# Patient Record
Sex: MALE | Race: OTHER | Employment: FULL TIME | URBAN - METROPOLITAN AREA
[De-identification: names, ages, dates, MRNs, and addresses within clinical notes are randomized per-mention and may not be internally consistent; named-entity substitution may affect disease eponyms.]

---

## 2019-08-28 PROBLEM — R79.89 LOW TESTOSTERONE IN MALE: Status: ACTIVE | Noted: 2019-08-28

## 2020-05-06 ENCOUNTER — APPOINTMENT (OUTPATIENT)
Dept: GENERAL RADIOLOGY | Age: 35
DRG: 871 | End: 2020-05-06
Attending: EMERGENCY MEDICINE
Payer: COMMERCIAL

## 2020-05-06 ENCOUNTER — HOSPITAL ENCOUNTER (INPATIENT)
Age: 35
LOS: 4 days | Discharge: HOME | DRG: 871 | End: 2020-05-11
Attending: EMERGENCY MEDICINE | Admitting: INTERNAL MEDICINE
Payer: COMMERCIAL

## 2020-05-06 DIAGNOSIS — R50.9 FEVER, UNSPECIFIED FEVER CAUSE: ICD-10-CM

## 2020-05-06 DIAGNOSIS — U07.1 PNEUMONIA DUE TO COVID-19 VIRUS: Primary | ICD-10-CM

## 2020-05-06 DIAGNOSIS — J12.82 PNEUMONIA DUE TO COVID-19 VIRUS: Primary | ICD-10-CM

## 2020-05-06 PROBLEM — B97.89 VIRAL SEPSIS (HCC): Status: ACTIVE | Noted: 2020-05-06

## 2020-05-06 PROBLEM — A41.89 VIRAL SEPSIS (HCC): Status: ACTIVE | Noted: 2020-05-06

## 2020-05-06 LAB
ALBUMIN SERPL-MCNC: 4.1 G/DL (ref 3.5–5)
ALBUMIN/GLOB SERPL: 0.9 {RATIO} (ref 1.2–3.5)
ALP SERPL-CCNC: 67 U/L (ref 50–136)
ALT SERPL-CCNC: 62 U/L (ref 12–65)
ANION GAP SERPL CALC-SCNC: 5 MMOL/L (ref 7–16)
APPEARANCE UR: CLEAR
AST SERPL-CCNC: 51 U/L (ref 15–37)
ATRIAL RATE: 120 BPM
BACTERIA URNS QL MICRO: 0 /HPF
BASOPHILS # BLD: 0 K/UL (ref 0–0.2)
BASOPHILS NFR BLD: 0 % (ref 0–2)
BILIRUB SERPL-MCNC: 0.3 MG/DL (ref 0.2–1.1)
BILIRUB UR QL: NEGATIVE
BNP SERPL-MCNC: 17 PG/ML (ref 5–125)
BUN SERPL-MCNC: 8 MG/DL (ref 6–23)
CALCIUM SERPL-MCNC: 8.6 MG/DL (ref 8.3–10.4)
CALCULATED P AXIS, ECG09: 64 DEGREES
CALCULATED R AXIS, ECG10: 76 DEGREES
CALCULATED T AXIS, ECG11: 25 DEGREES
CHLORIDE SERPL-SCNC: 103 MMOL/L (ref 98–107)
CO2 SERPL-SCNC: 31 MMOL/L (ref 21–32)
COLOR UR: YELLOW
CREAT SERPL-MCNC: 0.97 MG/DL (ref 0.8–1.5)
DIAGNOSIS, 93000: NORMAL
DIFFERENTIAL METHOD BLD: ABNORMAL
EOSINOPHIL # BLD: 0 K/UL (ref 0–0.8)
EOSINOPHIL NFR BLD: 0 % (ref 0.5–7.8)
ERYTHROCYTE [DISTWIDTH] IN BLOOD BY AUTOMATED COUNT: 13.1 % (ref 11.9–14.6)
GLOBULIN SER CALC-MCNC: 4.4 G/DL (ref 2.3–3.5)
GLUCOSE SERPL-MCNC: 115 MG/DL (ref 65–100)
GLUCOSE UR STRIP.AUTO-MCNC: NEGATIVE MG/DL
HCT VFR BLD AUTO: 48.5 % (ref 41.1–50.3)
HGB BLD-MCNC: 15.9 G/DL (ref 13.6–17.2)
HGB UR QL STRIP: NEGATIVE
IMM GRANULOCYTES # BLD AUTO: 0 K/UL (ref 0–0.5)
IMM GRANULOCYTES NFR BLD AUTO: 0 % (ref 0–5)
KETONES UR QL STRIP.AUTO: NEGATIVE MG/DL
LACTATE SERPL-SCNC: 1.4 MMOL/L (ref 0.4–2)
LEUKOCYTE ESTERASE UR QL STRIP.AUTO: NEGATIVE
LYMPHOCYTES # BLD: 0.6 K/UL (ref 0.5–4.6)
LYMPHOCYTES NFR BLD: 15 % (ref 13–44)
MCH RBC QN AUTO: 28.5 PG (ref 26.1–32.9)
MCHC RBC AUTO-ENTMCNC: 32.8 G/DL (ref 31.4–35)
MCV RBC AUTO: 86.9 FL (ref 79.6–97.8)
MONOCYTES # BLD: 0.3 K/UL (ref 0.1–1.3)
MONOCYTES NFR BLD: 6 % (ref 4–12)
NEUTS SEG # BLD: 3.4 K/UL (ref 1.7–8.2)
NEUTS SEG NFR BLD: 79 % (ref 43–78)
NITRITE UR QL STRIP.AUTO: NEGATIVE
NRBC # BLD: 0 K/UL (ref 0–0.2)
OTHER OBSERVATIONS,UCOM: ABNORMAL
P-R INTERVAL, ECG05: 134 MS
PH UR STRIP: 6.5 [PH] (ref 5–9)
PLATELET # BLD AUTO: 122 K/UL (ref 150–450)
PMV BLD AUTO: 11.7 FL (ref 9.4–12.3)
POTASSIUM SERPL-SCNC: 3.9 MMOL/L (ref 3.5–5.1)
PROCALCITONIN SERPL-MCNC: 0.13 NG/ML
PROT SERPL-MCNC: 8.5 G/DL (ref 6.3–8.2)
PROT UR STRIP-MCNC: 30 MG/DL
Q-T INTERVAL, ECG07: 304 MS
QRS DURATION, ECG06: 84 MS
QTC CALCULATION (BEZET), ECG08: 429 MS
RBC # BLD AUTO: 5.58 M/UL (ref 4.23–5.6)
SODIUM SERPL-SCNC: 139 MMOL/L (ref 136–145)
SP GR UR REFRACTOMETRY: 1.01 (ref 1–1.02)
UROBILINOGEN UR QL STRIP.AUTO: 1 EU/DL (ref 0.2–1)
VENTRICULAR RATE, ECG03: 120 BPM
WBC # BLD AUTO: 4.3 K/UL (ref 4.3–11.1)
WBC URNS QL MICRO: ABNORMAL /HPF

## 2020-05-06 PROCEDURE — 99285 EMERGENCY DEPT VISIT HI MDM: CPT

## 2020-05-06 PROCEDURE — 83615 LACTATE (LD) (LDH) ENZYME: CPT

## 2020-05-06 PROCEDURE — 84484 ASSAY OF TROPONIN QUANT: CPT

## 2020-05-06 PROCEDURE — 87040 BLOOD CULTURE FOR BACTERIA: CPT

## 2020-05-06 PROCEDURE — 93005 ELECTROCARDIOGRAM TRACING: CPT | Performed by: EMERGENCY MEDICINE

## 2020-05-06 PROCEDURE — 82728 ASSAY OF FERRITIN: CPT

## 2020-05-06 PROCEDURE — 80053 COMPREHEN METABOLIC PANEL: CPT

## 2020-05-06 PROCEDURE — 81001 URINALYSIS AUTO W/SCOPE: CPT

## 2020-05-06 PROCEDURE — 85730 THROMBOPLASTIN TIME PARTIAL: CPT

## 2020-05-06 PROCEDURE — 85384 FIBRINOGEN ACTIVITY: CPT

## 2020-05-06 PROCEDURE — 83605 ASSAY OF LACTIC ACID: CPT

## 2020-05-06 PROCEDURE — 85025 COMPLETE CBC W/AUTO DIFF WBC: CPT

## 2020-05-06 PROCEDURE — 84145 PROCALCITONIN (PCT): CPT

## 2020-05-06 PROCEDURE — 96365 THER/PROPH/DIAG IV INF INIT: CPT

## 2020-05-06 PROCEDURE — 71045 X-RAY EXAM CHEST 1 VIEW: CPT

## 2020-05-06 PROCEDURE — 86140 C-REACTIVE PROTEIN: CPT

## 2020-05-06 PROCEDURE — 74011250636 HC RX REV CODE- 250/636: Performed by: EMERGENCY MEDICINE

## 2020-05-06 PROCEDURE — 83880 ASSAY OF NATRIURETIC PEPTIDE: CPT

## 2020-05-06 PROCEDURE — 83735 ASSAY OF MAGNESIUM: CPT

## 2020-05-06 PROCEDURE — 74011000258 HC RX REV CODE- 258: Performed by: EMERGENCY MEDICINE

## 2020-05-06 PROCEDURE — 74011250637 HC RX REV CODE- 250/637: Performed by: EMERGENCY MEDICINE

## 2020-05-06 PROCEDURE — 85379 FIBRIN DEGRADATION QUANT: CPT

## 2020-05-06 PROCEDURE — 99218 HC RM OBSERVATION: CPT

## 2020-05-06 RX ORDER — ENOXAPARIN SODIUM 100 MG/ML
30 INJECTION SUBCUTANEOUS EVERY 24 HOURS
Status: DISCONTINUED | OUTPATIENT
Start: 2020-05-06 | End: 2020-05-06

## 2020-05-06 RX ORDER — ACETAMINOPHEN 500 MG
1000 TABLET ORAL
Status: COMPLETED | OUTPATIENT
Start: 2020-05-06 | End: 2020-05-06

## 2020-05-06 RX ORDER — ACETAMINOPHEN 650 MG/1
650 SUPPOSITORY RECTAL
Status: DISCONTINUED | OUTPATIENT
Start: 2020-05-06 | End: 2020-05-12 | Stop reason: HOSPADM

## 2020-05-06 RX ORDER — ENOXAPARIN SODIUM 100 MG/ML
40 INJECTION SUBCUTANEOUS EVERY 24 HOURS
Status: DISCONTINUED | OUTPATIENT
Start: 2020-05-07 | End: 2020-05-12 | Stop reason: HOSPADM

## 2020-05-06 RX ORDER — SODIUM CHLORIDE 0.9 % (FLUSH) 0.9 %
5-40 SYRINGE (ML) INJECTION AS NEEDED
Status: DISCONTINUED | OUTPATIENT
Start: 2020-05-06 | End: 2020-05-12 | Stop reason: HOSPADM

## 2020-05-06 RX ORDER — SODIUM CHLORIDE 0.9 % (FLUSH) 0.9 %
5-40 SYRINGE (ML) INJECTION EVERY 8 HOURS
Status: DISCONTINUED | OUTPATIENT
Start: 2020-05-06 | End: 2020-05-12 | Stop reason: HOSPADM

## 2020-05-06 RX ORDER — HYDROXYCHLOROQUINE SULFATE 200 MG/1
200 TABLET, FILM COATED ORAL EVERY 12 HOURS
Status: COMPLETED | OUTPATIENT
Start: 2020-05-08 | End: 2020-05-11

## 2020-05-06 RX ORDER — HYDROXYCHLOROQUINE SULFATE 200 MG/1
400 TABLET, FILM COATED ORAL EVERY 12 HOURS
Status: COMPLETED | OUTPATIENT
Start: 2020-05-07 | End: 2020-05-07

## 2020-05-06 RX ORDER — ACETAMINOPHEN 325 MG/1
650 TABLET ORAL
Status: DISCONTINUED | OUTPATIENT
Start: 2020-05-06 | End: 2020-05-12 | Stop reason: HOSPADM

## 2020-05-06 RX ORDER — ACETAMINOPHEN 325 MG/1
650 TABLET ORAL
Status: DISCONTINUED | OUTPATIENT
Start: 2020-05-06 | End: 2020-05-06 | Stop reason: SDUPTHER

## 2020-05-06 RX ADMIN — CEFTRIAXONE 1 G: 1 INJECTION, POWDER, FOR SOLUTION INTRAMUSCULAR; INTRAVENOUS at 20:22

## 2020-05-06 RX ADMIN — ACETAMINOPHEN 1000 MG: 500 TABLET ORAL at 19:53

## 2020-05-06 RX ADMIN — SODIUM CHLORIDE 1000 ML: 900 INJECTION, SOLUTION INTRAVENOUS at 21:08

## 2020-05-06 NOTE — PROGRESS NOTES
called ED unit secretary desk to offer interpreting services to patient. Hospital  is available 24/7 for over-the-phone language services. Please call [phone] (DT) or [phone] (ES) for all requests.         [facility]  Language Services Department  [address]  [address] [phone] (phone)

## 2020-05-06 NOTE — ED TRIAGE NOTES
Patient states that he was seen at urgent care this AM. Was told by urgent care that he has pneumonia. Was tested Saturday for Covid-19 and called Sunday with a positive test. Patient states that he has been more short of breath today. Patient arrived with mask in place during triage.

## 2020-05-06 NOTE — PROGRESS NOTES
present over the phone for assessment with Evangelista Granda MD and EKG with nurse.       [facility] Services Department  [address] [place] [address]  [address] [phone] (phone)

## 2020-05-07 PROBLEM — U07.1 COVID-19 VIRUS DETECTED: Status: ACTIVE | Noted: 2020-05-07

## 2020-05-07 PROBLEM — R09.02 HYPOXIA: Status: ACTIVE | Noted: 2020-05-07

## 2020-05-07 LAB
ABO + RH BLD: NORMAL
ALBUMIN SERPL-MCNC: 3.3 G/DL (ref 3.5–5)
ALBUMIN/GLOB SERPL: 0.8 {RATIO} (ref 1.2–3.5)
ALP SERPL-CCNC: 65 U/L (ref 50–136)
ALT SERPL-CCNC: 54 U/L (ref 12–65)
ANION GAP SERPL CALC-SCNC: 6 MMOL/L (ref 7–16)
APTT PPP: 30.8 SEC (ref 24.3–35.4)
APTT PPP: 33.7 SEC (ref 24.3–35.4)
AST SERPL-CCNC: 46 U/L (ref 15–37)
BASOPHILS # BLD: 0 K/UL (ref 0–0.2)
BASOPHILS NFR BLD: 0 % (ref 0–2)
BILIRUB SERPL-MCNC: 0.3 MG/DL (ref 0.2–1.1)
BLOOD GROUP ANTIBODIES SERPL: NORMAL
BUN SERPL-MCNC: 7 MG/DL (ref 6–23)
CALCIUM SERPL-MCNC: 8.1 MG/DL (ref 8.3–10.4)
CHLORIDE SERPL-SCNC: 106 MMOL/L (ref 98–107)
CO2 SERPL-SCNC: 26 MMOL/L (ref 21–32)
CREAT SERPL-MCNC: 0.84 MG/DL (ref 0.8–1.5)
CRP SERPL-MCNC: 5.8 MG/DL (ref 0–0.9)
D DIMER PPP FEU-MCNC: 0.46 UG/ML(FEU)
D DIMER PPP FEU-MCNC: 0.82 UG/ML(FEU)
DIFFERENTIAL METHOD BLD: ABNORMAL
EOSINOPHIL # BLD: 0 K/UL (ref 0–0.8)
EOSINOPHIL NFR BLD: 0 % (ref 0.5–7.8)
ERYTHROCYTE [DISTWIDTH] IN BLOOD BY AUTOMATED COUNT: 13.1 % (ref 11.9–14.6)
FERRITIN SERPL-MCNC: 904 NG/ML (ref 8–388)
FIBRINOGEN PPP-MCNC: 534 MG/DL (ref 190–501)
FIBRINOGEN PPP-MCNC: 535 MG/DL (ref 190–501)
GLOBULIN SER CALC-MCNC: 3.9 G/DL (ref 2.3–3.5)
GLUCOSE SERPL-MCNC: 130 MG/DL (ref 65–100)
HCT VFR BLD AUTO: 42.5 % (ref 41.1–50.3)
HGB BLD-MCNC: 14.6 G/DL (ref 13.6–17.2)
IMM GRANULOCYTES # BLD AUTO: 0 K/UL (ref 0–0.5)
IMM GRANULOCYTES NFR BLD AUTO: 0 % (ref 0–5)
INR PPP: 0.9
INR PPP: 0.9
LDH SERPL L TO P-CCNC: 327 U/L (ref 100–190)
LYMPHOCYTES # BLD: 0.5 K/UL (ref 0.5–4.6)
LYMPHOCYTES NFR BLD: 12 % (ref 13–44)
MAGNESIUM SERPL-MCNC: 1.9 MG/DL (ref 1.8–2.4)
MAGNESIUM SERPL-MCNC: 1.9 MG/DL (ref 1.8–2.4)
MCH RBC QN AUTO: 28.9 PG (ref 26.1–32.9)
MCHC RBC AUTO-ENTMCNC: 34.4 G/DL (ref 31.4–35)
MCV RBC AUTO: 84.2 FL (ref 79.6–97.8)
MONOCYTES # BLD: 0.2 K/UL (ref 0.1–1.3)
MONOCYTES NFR BLD: 5 % (ref 4–12)
NEUTS SEG # BLD: 3.8 K/UL (ref 1.7–8.2)
NEUTS SEG NFR BLD: 83 % (ref 43–78)
NRBC # BLD: 0 K/UL (ref 0–0.2)
PHOSPHATE SERPL-MCNC: 2.6 MG/DL (ref 2.5–4.5)
PLATELET # BLD AUTO: 138 K/UL (ref 150–450)
PMV BLD AUTO: 11.2 FL (ref 9.4–12.3)
POTASSIUM SERPL-SCNC: 3.4 MMOL/L (ref 3.5–5.1)
PROT SERPL-MCNC: 7.2 G/DL (ref 6.3–8.2)
PROTHROMBIN TIME: 12.3 SEC (ref 12–14.7)
PROTHROMBIN TIME: 12.8 SEC (ref 12–14.7)
RBC # BLD AUTO: 5.05 M/UL (ref 4.23–5.6)
SODIUM SERPL-SCNC: 138 MMOL/L (ref 136–145)
SPECIMEN EXP DATE BLD: NORMAL
TROPONIN I SERPL-MCNC: <0.02 NG/ML (ref 0.02–0.05)
WBC # BLD AUTO: 4.6 K/UL (ref 4.3–11.1)

## 2020-05-07 PROCEDURE — 99218 HC RM OBSERVATION: CPT

## 2020-05-07 PROCEDURE — 80053 COMPREHEN METABOLIC PANEL: CPT

## 2020-05-07 PROCEDURE — 85379 FIBRIN DEGRADATION QUANT: CPT

## 2020-05-07 PROCEDURE — 85610 PROTHROMBIN TIME: CPT

## 2020-05-07 PROCEDURE — 86900 BLOOD TYPING SEROLOGIC ABO: CPT

## 2020-05-07 PROCEDURE — 74011250636 HC RX REV CODE- 250/636: Performed by: FAMILY MEDICINE

## 2020-05-07 PROCEDURE — 65270000029 HC RM PRIVATE

## 2020-05-07 PROCEDURE — 85730 THROMBOPLASTIN TIME PARTIAL: CPT

## 2020-05-07 PROCEDURE — 83735 ASSAY OF MAGNESIUM: CPT

## 2020-05-07 PROCEDURE — 36415 COLL VENOUS BLD VENIPUNCTURE: CPT

## 2020-05-07 PROCEDURE — 74011250637 HC RX REV CODE- 250/637: Performed by: FAMILY MEDICINE

## 2020-05-07 PROCEDURE — 85384 FIBRINOGEN ACTIVITY: CPT

## 2020-05-07 PROCEDURE — 84100 ASSAY OF PHOSPHORUS: CPT

## 2020-05-07 PROCEDURE — 74011250637 HC RX REV CODE- 250/637: Performed by: INTERNAL MEDICINE

## 2020-05-07 PROCEDURE — 85025 COMPLETE CBC W/AUTO DIFF WBC: CPT

## 2020-05-07 RX ORDER — POTASSIUM CHLORIDE 20 MEQ/1
40 TABLET, EXTENDED RELEASE ORAL
Status: COMPLETED | OUTPATIENT
Start: 2020-05-07 | End: 2020-05-07

## 2020-05-07 RX ORDER — PANTOPRAZOLE SODIUM 40 MG/1
40 TABLET, DELAYED RELEASE ORAL
Status: DISCONTINUED | OUTPATIENT
Start: 2020-05-07 | End: 2020-05-12 | Stop reason: HOSPADM

## 2020-05-07 RX ORDER — ASCORBIC ACID 500 MG
1500 TABLET ORAL DAILY
Status: DISCONTINUED | OUTPATIENT
Start: 2020-05-07 | End: 2020-05-07

## 2020-05-07 RX ORDER — ASCORBIC ACID 500 MG
1500 TABLET ORAL EVERY 6 HOURS
Status: DISCONTINUED | OUTPATIENT
Start: 2020-05-08 | End: 2020-05-12 | Stop reason: HOSPADM

## 2020-05-07 RX ORDER — UREA 10 %
220 LOTION (ML) TOPICAL DAILY
Status: DISCONTINUED | OUTPATIENT
Start: 2020-05-07 | End: 2020-05-12 | Stop reason: HOSPADM

## 2020-05-07 RX ADMIN — ACETAMINOPHEN 650 MG: 325 TABLET, FILM COATED ORAL at 11:09

## 2020-05-07 RX ADMIN — OXYCODONE HYDROCHLORIDE AND ACETAMINOPHEN 1500 MG: 500 TABLET ORAL at 04:00

## 2020-05-07 RX ADMIN — Medication 10 ML: at 11:05

## 2020-05-07 RX ADMIN — ENOXAPARIN SODIUM 40 MG: 40 INJECTION SUBCUTANEOUS at 23:43

## 2020-05-07 RX ADMIN — ACETAMINOPHEN 650 MG: 325 TABLET, FILM COATED ORAL at 04:00

## 2020-05-07 RX ADMIN — OXYCODONE HYDROCHLORIDE AND ACETAMINOPHEN 1500 MG: 500 TABLET ORAL at 23:42

## 2020-05-07 RX ADMIN — PANTOPRAZOLE SODIUM 40 MG: 40 TABLET, DELAYED RELEASE ORAL at 11:02

## 2020-05-07 RX ADMIN — Medication 10 ML: at 01:20

## 2020-05-07 RX ADMIN — Medication 10 ML: at 23:43

## 2020-05-07 RX ADMIN — Medication 220 MG: at 08:23

## 2020-05-07 RX ADMIN — POTASSIUM CHLORIDE 40 MEQ: 20 TABLET, EXTENDED RELEASE ORAL at 11:02

## 2020-05-07 RX ADMIN — HYDROXYCHLOROQUINE SULFATE 400 MG: 200 TABLET ORAL at 11:03

## 2020-05-07 RX ADMIN — HYDROXYCHLOROQUINE SULFATE 400 MG: 200 TABLET ORAL at 01:20

## 2020-05-07 RX ADMIN — Medication 10 ML: at 05:21

## 2020-05-07 NOTE — PHYSICIAN ADVISORY
Letter of Status Determination:  
Recommend hospitalization status upgraded from OBSERVATION  to INPATIENT  Status Pt Name:  Hersey Pallas MR#  
ERIBERTO # [id number] / 
[id number] Payor: Ct Manning / Plan: [place] / Product Type: PPO /   
MAGEN#  [id number] Room and Hospital  504/01  @ [facility] Hospitalization date  5/6/2020  7:23 PM  
Current Attending Physician  Mati Perry MD  
Principal diagnosis  2019 novel coronavirusinfected pneumonia (NCIP) [U07.1, J12.89] Clinicals  29 y.o. y.o  male hospitalized with above diagnosis This pt is receiving complex care for acute viral pneumonia along with high risk for complication [place] (MCG) criteria Does  apply Viral Pneumonia STATUS DETERMINATION  Based on documented presenting clinical data, comorbid conditions, high risk of adverse events and deterioration, it is our recommendation that the patient's status should be upgraded from OBSERVATION to INPATIENT status. The final decision of the patient's hospitalization status depends on the attending physician's judgment. Additional comments Payor: Ct Manning / Plan: [employer] / Product Type: PPO /  
 
The information in this document is a recommendation to be used for utilization review and utilization management purposes only. This recommendation is not an order. The recommendation is made based on the information reviewed at the time of the referral, is pursuant to the [facility] Conditions of Participation (42 CFR Part 482), and is neither a judgment nor an assessment with regard to the appropriateness or quality of clinical care. For all Managed Care patients:  The Criteria are intended solely for use as screening guidelines with respect to the medical appropriateness of healthcare services and not for final clinical or payment determinations concerning the type or level of medical care provided, or proposed to be provided, to a patient. They help the reviewers determine whether a patient is appropriate for observation or inpatient admission at the time a decision to admit the patient is being made. All efforts are made to apply the pertinent payor criteria (MCG or [employer]) as well as the clinical judgements based on the information reviewed at the time of the referral.\" Nothing in this document may be used to limit, alter, or affect clinical services provided to the patient named below. Brett Valentin MD MPH FACP Cell: [phone] Physician Advisor [address]  
   
Cell  [phone]   
 
 
[id number] Nadya Garcia

## 2020-05-07 NOTE — PROGRESS NOTES
05/07/20 1106   Vital Signs   Temp 99.7 °F (37.6 °C)   Temp Source Oral   Pulse (Heart Rate) (!) 117   Resp Rate 21   O2 Sat (%) 95 %   Level of Consciousness Alert   /78   MAP (Calculated) 95   BP 1 Method Automatic   BP 1 Location Left arm   BP Patient Position At rest   MEWS Score 4

## 2020-05-07 NOTE — PROGRESS NOTES
Chart screened by  for discharge planning. No needs identified at this time. Please consult  if any new issues arise  Discharge plan is home with needs at this time.     Care Management Interventions  PCP Verified by CM: Yes(Clark Huntley)  Mode of Transport at Discharge: Self  Transition of Care Consult (CM Consult): Discharge Planning  Discharge Durable Medical Equipment: No  Physical Therapy Consult: No  Occupational Therapy Consult: No  Speech Therapy Consult: No  Current Support Network: Own Home, Family Lives Nearby  Confirm Follow Up Transport: Family  The Patient and/or Patient Representative was Provided with a Choice of Provider and Agrees with the Discharge Plan?: Yes  Freedom of Choice List was Provided with Basic Dialogue that Supports the Patient's Individualized Plan of Care/Goals, Treatment Preferences and Shares the Quality Data Associated with the Providers?: Yes  [employer] Information Provided?: No  Discharge Location  Discharge Placement: Home

## 2020-05-07 NOTE — PROGRESS NOTES
Hospital  called patient's room to conduct \"rounding\" over the phone. A hospital  is available for over-the-phone language services. Please call [phone] (DT) or [phone] (ES) for all requests.      Rohith Delarosa  CERTIFIED HEALTHCARE INTERPRETERTM ([address] [place] [address])  Language Services Department   [address]  [facility]  [phone] (phone)

## 2020-05-07 NOTE — PROGRESS NOTES
rounded on patient with nurse. Interpreting services offered when needed. Hospital  is available 24/7 for over-the-phone language services. Please call [phone] (DT) or [phone] (ES) for all requests.         [facility]  Language Services Department  [address]  [facility]  [id number] [phone] (phone)

## 2020-05-07 NOTE — ED PROVIDER NOTES
15-year-old male presents with complaint of worsening fever, shortness of breath over the past several days. Patient states that his symptoms initially started on Thursday. Patient was reportedly tested for COVID-19 at drive thru testing through [facility] on Saturday and was contacted on Sunday with positive test result. Patient states that he was seen at urgent care earlier this morning and was told by urgent care that he likely had pneumonia. Patient was given prescription for Levaquin. Patient states that he is had progressive shortness of breath of the past several days and fever. Patient denies productive cough, headache, neck pain, sore throat, myalgias, abdominal pain, nausea, vomiting, urinary symptoms. States that his recently tested positive as well. The history is provided by the patient. No  was used. Shortness of Breath   This is a new problem. The problem occurs continuously. The current episode started more than 2 days ago. The problem has not changed since onset. Associated symptoms include a fever. Pertinent negatives include no headaches, no coryza, no rhinorrhea, no sore throat, no swollen glands, no neck pain, no cough, no sputum production, no hemoptysis, no wheezing, no chest pain, no syncope, no vomiting, no abdominal pain, no rash, no leg pain, no leg swelling and no claudication. The treatment provided no relief. Fever    Associated symptoms include shortness of breath. Pertinent negatives include no chest pain, no diarrhea, no vomiting, no congestion, no headaches, no sore throat, no cough, no neck pain and no rash. Past Medical History:   Diagnosis Date    Chronic low back pain without sciatica     GERD (gastroesophageal reflux disease)     Hypercholesterolemia        No past surgical history on file.       Family History:   Problem Relation Age of Onset    No Known Problems Mother     No Known Problems Father        Social History Socioeconomic History    Marital status:      Spouse name: Not on file    Number of children: Not on file    Years of education: Not on file    Highest education level: Not on file   Occupational History    Not on file   Social Needs    Financial resource strain: Not on file    Food insecurity     Worry: Not on file     Inability: Not on file    Transportation needs     Medical: Not on file     Non-medical: Not on file   Tobacco Use    Smoking status: Never Smoker    Smokeless tobacco: Never Used   Substance and Sexual Activity    Alcohol use: No    Drug use: No    Sexual activity: Yes     Partners: Female   Lifestyle    Physical activity     Days per week: Not on file     Minutes per session: Not on file    Stress: Not on file   Relationships    Social connections     Talks on phone: Not on file     Gets together: Not on file     Attends Holiness service: Not on file     Active member of club or organization: Not on file     Attends meetings of clubs or organizations: Not on file     Relationship status: Not on file    Intimate partner violence     Fear of current or ex partner: Not on file     Emotionally abused: Not on file     Physically abused: Not on file     Forced sexual activity: Not on file   Other Topics Concern    Not on file   Social History Narrative    Not on file         ALLERGIES: Patient has no known allergies. Review of Systems   Constitutional: Positive for chills and fever. Negative for diaphoresis and fatigue. HENT: Negative for congestion, rhinorrhea, sore throat, trouble swallowing and voice change. Respiratory: Positive for shortness of breath. Negative for cough, hemoptysis, sputum production, choking, wheezing and stridor. Cardiovascular: Negative for chest pain, claudication, leg swelling and syncope. Gastrointestinal: Negative for abdominal pain, diarrhea, nausea and vomiting. Genitourinary: Negative for dysuria and flank pain. Musculoskeletal: Negative for myalgias, neck pain and neck stiffness. Skin: Negative for rash. Neurological: Negative for dizziness, syncope, weakness, light-headedness, numbness and headaches. Psychiatric/Behavioral: Negative for confusion. Vitals:    05/06/20 1930   BP: 134/83   Pulse: (!) 123   Resp: 23   Temp: (!) 101.2 °F (38.4 °C)   SpO2: 96%   Weight: 108.9 kg (240 lb)   Height: 5' 11\" (1.803 m)            Physical Exam  Vitals signs and nursing note reviewed. Constitutional:       Comments: Ill-appearing. HENT:      Head: Normocephalic. Mouth/Throat:      Mouth: Mucous membranes are moist.   Eyes:      Extraocular Movements: Extraocular movements intact. Pupils: Pupils are equal, round, and reactive to light. Neck:      Vascular: No JVD. Comments: No nuchal rigidity. Cardiovascular:      Rate and Rhythm: Regular rhythm. Tachycardia present. Pulses: Normal pulses. Heart sounds: Normal heart sounds. Comments: Pulses 2+ and equal throughout. Pulmonary:      Comments: Tachypneic. Diminished breath sounds present bilaterally. Musculoskeletal: Normal range of motion. Comments: No LE edema. No calf TTP. Skin:     General: Skin is warm. Findings: No erythema. Neurological:      General: No focal deficit present. Mental Status: He is alert and oriented to person, place, and time. Comments: Strength 5 out of 5 throughout. No meningeal signs present. MDM  Number of Diagnoses or Management Options  Fever, unspecified fever cause: new and requires workup  Pneumonia due to COVID-19 virus: new and requires workup  Diagnosis management comments: 80-year-old male presents with complaint of shortness of breath and fever over the past several days. Patient recently tested positive for COVID-19. Patient was tested on 5/2 and contacted about positive result on 5/3. Patient currently on Levaquin.   Patient found to be febrile, tachycardic, tachypneic. Chest x-ray with evidence of patchy bibasilar airspace opacities. Orders placed for Tylenol, Rocephin 1 g IV, IV fluid hydration. Labs unremarkable. Patient was ambulated in place in room with significant O2 desat to around 88% on room air. Patient became extremely tachypneic and tachycardic with heart rate in the 140s to 150s. Given these findings hospitalist consulted for admission. Amount and/or Complexity of Data Reviewed  Clinical lab tests: ordered and reviewed  Tests in the radiology section of CPT®: ordered and reviewed  Tests in the medicine section of CPT®: reviewed and ordered  Review and summarize past medical records: yes  Independent visualization of images, tracings, or specimens: yes    Risk of Complications, Morbidity, and/or Mortality  Presenting problems: moderate  Diagnostic procedures: moderate  Management options: moderate    Patient Progress  Patient progress: stable    ED Course as of May 06 2118   Wed May 06, 2020   2013 CXR IMPRESSION: Patchy bibasilar airspace opacities could represent pneumonia  including viral pneumonitis given the provided history. [DF]   2115 Patient ambulated in place in room for around 1 minute and was noted to desat to 88% on room air. Patient's heart rate increased to 140s-150s. [DF]      ED Course User Index  [DF] Vivian Vasquez MD       EKG  Date/Time: 5/6/2020 9:05 PM  Performed by: Vivian Vasquez MD  Authorized by:  Vivian Vasquez MD     ECG reviewed by ED Physician in the absence of a cardiologist: yes    Rate:     ECG rate:  120    ECG rate assessment: tachycardic    Rhythm:     Rhythm: sinus tachycardia    Ectopy:     Ectopy: none    QRS:     QRS axis:  Normal    QRS intervals:  Normal  Conduction:     Conduction: normal    ST segments:     ST segments:  Normal  T waves:     T waves: normal            Results Include:    Recent Results (from the past 24 hour(s))   CBC WITH AUTOMATED DIFF Collection Time: 05/06/20  7:49 PM   Result Value Ref Range    WBC 4.3 4.3 - 11.1 K/uL    RBC 5.58 4.23 - 5.6 M/uL    HGB 15.9 13.6 - 17.2 g/dL    HCT 48.5 41.1 - 50.3 %    MCV 86.9 79.6 - 97.8 FL    MCH 28.5 26.1 - 32.9 PG    MCHC 32.8 31.4 - 35.0 g/dL    RDW 13.1 11.9 - 14.6 %    PLATELET 398 (L) 735 - 450 K/uL    MPV 11.7 9.4 - 12.3 FL    ABSOLUTE NRBC 0.00 0.0 - 0.2 K/uL    DF AUTOMATED      NEUTROPHILS 79 (H) 43 - 78 %    LYMPHOCYTES 15 13 - 44 %    MONOCYTES 6 4.0 - 12.0 %    EOSINOPHILS 0 (L) 0.5 - 7.8 %    BASOPHILS 0 0.0 - 2.0 %    IMMATURE GRANULOCYTES 0 0.0 - 5.0 %    ABS. NEUTROPHILS 3.4 1.7 - 8.2 K/UL    ABS. LYMPHOCYTES 0.6 0.5 - 4.6 K/UL    ABS. MONOCYTES 0.3 0.1 - 1.3 K/UL    ABS. EOSINOPHILS 0.0 0.0 - 0.8 K/UL    ABS. BASOPHILS 0.0 0.0 - 0.2 K/UL    ABS. IMM. GRANS. 0.0 0.0 - 0.5 K/UL   METABOLIC PANEL, COMPREHENSIVE    Collection Time: 05/06/20  7:49 PM   Result Value Ref Range    Sodium 139 136 - 145 mmol/L    Potassium 3.9 3.5 - 5.1 mmol/L    Chloride 103 98 - 107 mmol/L    CO2 31 21 - 32 mmol/L    Anion gap 5 (L) 7 - 16 mmol/L    Glucose 115 (H) 65 - 100 mg/dL    BUN 8 6 - 23 MG/DL    Creatinine 0.97 0.8 - 1.5 MG/DL    GFR est AA >60 >60 ml/min/1.73m2    GFR est non-AA >60 >60 ml/min/1.73m2    Calcium 8.6 8.3 - 10.4 MG/DL    Bilirubin, total 0.3 0.2 - 1.1 MG/DL    ALT (SGPT) 62 12 - 65 U/L    AST (SGOT) 51 (H) 15 - 37 U/L    Alk.  phosphatase 67 50 - 136 U/L    Protein, total 8.5 (H) 6.3 - 8.2 g/dL    Albumin 4.1 3.5 - 5.0 g/dL    Globulin 4.4 (H) 2.3 - 3.5 g/dL    A-G Ratio 0.9 (L) 1.2 - 3.5     LACTIC ACID    Collection Time: 05/06/20  7:49 PM   Result Value Ref Range    Lactic acid 1.4 0.4 - 2.0 MMOL/L   EKG, 12 LEAD, INITIAL    Collection Time: 05/06/20  7:50 PM   Result Value Ref Range    Ventricular Rate 120 BPM    Atrial Rate 120 BPM    P-R Interval 134 ms    QRS Duration 84 ms    Q-T Interval 304 ms    QTC Calculation (Bezet) 429 ms    Calculated P Axis 64 degrees    Calculated R Axis 76 degrees    Calculated T Axis 25 degrees    Diagnosis       !! AGE AND GENDER SPECIFIC ECG ANALYSIS !! Sinus tachycardia  Otherwise normal ECG     URINALYSIS W/ RFLX MICROSCOPIC    Collection Time: 05/06/20  8:03 PM   Result Value Ref Range    Color YELLOW      Appearance CLEAR      Specific gravity 1.014 1.001 - 1.023      pH (UA) 6.5 5.0 - 9.0      Protein 30 (A) NEG mg/dL    Glucose Negative mg/dL    Ketone Negative NEG mg/dL    Bilirubin Negative NEG      Blood Negative NEG      Urobilinogen 1.0 0.2 - 1.0 EU/dL    Nitrites Negative NEG      Leukocyte Esterase Negative NEG      WBC 0-3 0 /hpf    Bacteria 0 0 /hpf    Other observations RESULTS VERIFIED MANUALLY       Raymond Del Rosario MD; 5/6/2020 @9:06 PM Voice dictation software was used during the making of this note. This software is not perfect and grammatical and other typographical errors may be present.   This note has not been proofread for errors.  ===================================================================

## 2020-05-07 NOTE — PROGRESS NOTES
Pt in room alert and oriented rr are even lung sounds are course with some crackles noted. O2 3L NC in place. abd is soft bowel sounds are active. Skin intact no new issues noted. Safety measures in place will continue to monitor.

## 2020-05-07 NOTE — PROGRESS NOTES
Pt arrived to floor via stretcher. Pt able to ambulate by self. Pt short of breath when ambulating and at rest. Pt is on 2 L NC. Pt alert and oriented x4. Pt is running sinus tach on remote telemetry. Skin is clean, dry and intact. Dual skin assessment performed with Lemuel Shukla RN. Call light within reach, bed low and locked, door closed.

## 2020-05-07 NOTE — ROUTINE PROCESS
This hospital  called ED nursing station offered language services available 24/7 and provided contact information. Jose M Ramirez [facility] [address]  [facility] Language Services Department 
[address]  [address] 
[phone](FLKWY)

## 2020-05-07 NOTE — PROGRESS NOTES
's initial visit via phone call with assistance from [employer] [place] [address] . Mr. Dante Booker appears to be coping very well during this hospital stay. He voiced no spiritual or emotional concerns. Patient states he is breathing much better than yesterday and that he is hopeful for discharge tomorrow.      [address]  Board Certified

## 2020-05-07 NOTE — PROGRESS NOTES
Hospital  is available for over-the-phone language services. Please call [phone] (DT) or [phone] (ES) for all requests 24/7. Virtual rounding, Interpreting services offered to assist with any requests.         Thank you,      Judie Finney,   Rowdy Vinson [address]  [phone] (phone)

## 2020-05-07 NOTE — H&P
History of Present Illness:  29year old male [facility] on [facility]6/2020 with the complaint of progressive shortness of breath, cough productive of green sputum, pleuritic chest pain. Please note the patient is primarily Kiswahili-speaking and all history was obtained through . Patient tested positive for novel coronavirus infection on 5/2/2020. Patient had progressive symptoms as mentioned above and presented to the emergency department for further evaluation. Patient does condone headache, rhinorrhea, anosmia. Patient denies nausea, vomiting, diarrhea, abdominal pain. Patient has been taking codeine/homatroprine, albuterol MDI, levofloxacin, azithromycin as an outpatient with no improvement    In the emergency department the patient was noted to desaturate to 88% on room air while standing, temperature of 101.2F, heart rate ranging between 112 and 123. Chest x-ray with patchy bibasilar opacities, left greater than right with small left pleural effusion. EKG was sinus tachycardia and . Patient was treated with ceftriaxone and 1 L NS IVF in emergency department    Comprehensive review of systems negative unless stated above. Past Medical History:   HLD  GERD  Impotence    Past Surgical History:  None    Family History:   Mother and father are healthy    Social History:  Works for [employer],   Never smoker, denies alcohol abuse or illicit drug use    Physical Exam:  General: Young  male, sitting up in bed, no acute distress  HEENT: NCAT, moist mucous membranes, surgical mask in place  Skin: No rash noted  Cardio: Tachycardic, regular rhythm, normal S1/S2, no rubs, no gallops, no murmurs  Pulm: Mildly labored respirations on 2 LPM via NC, diminished breath sounds in bilateral bases, no wheezing, rales to mid lung field, no rhonchi  GI: Soft, Nt, Nd, Nml bowel sounds, no masses noted  Extremity: Atraumatic, no deformities, no edema  Neuro: Alert, oriented, moving all extremities, no focal deficits noted  Psych: Pleasant, cooperative, normal range of affect    I have personally reviewed all current data for this patient. Assessment and Plan:    #Novel coronavirus infected pneumonia, acute respiratory failure with hypoxia: Patient tested positive for novel coronavirus on 5/2/2020. Patient progressive symptoms to the point where he was hypoxic on room air with any exertion in the emergency department. Chest x-ray patchy bibasilar opacities and small left pleural effusion. Fibrinogen, d-dimer, ferritin, CRP, LDH all elevated.   Normal INR.  -Admit inpatient  -Supplemental oxygen, wean as able, recommend walking oximetry test on the morning of 5/7/2020 to assess if patient is safe for discharge  -Hydroxychloroquine oral  -Ascorbic acid, zinc sulfate  -Type and screen, may benefit from convalescent plasma  -Daily labs  -Daily EKG    Full Code    Inpatient for above    Enoxaparin for VTE prevention    Please call [phone] for questions or concerns

## 2020-05-07 NOTE — ROUTINE PROCESS
TRANSFER - OUT REPORT: 
 
Verbal report given to refugio(name) on Keren Morataya  being transferred to pulmonary(unit) for routine progression of care Report consisted of patients Situation, Background, Assessment and  
Recommendations(SBAR). Information from the following report(s) SBAR was reviewed with the receiving nurse. Lines:  
Peripheral IV 05/06/20 Right Hand (Active) Phlebitis Assessment 0 5/6/2020  7:58 PM  
Infiltration Assessment 0 5/6/2020  7:58 PM  
Dressing Status Clean, dry, & intact 5/6/2020  7:58 PM  
Hub Color/Line Status Blue 5/6/2020  7:58 PM  
  
 
Opportunity for questions and clarification was provided. Patient transported with: 
 O2 @ 2 liters

## 2020-05-07 NOTE — PROGRESS NOTES
Progress Note    Patient: Lindie Seip MRN: [id number]  SSN: [id number]    YOB: 1985  Age: 29 y.o. Sex: male      Admit Date: 5/6/2020    LOS: 0 days     Subjective:   He was found in no distress, ambulating inside the room. On 2 liters NC, able to speak in full sentence. He has mild cough, no sob. Objective:     Vitals:    05/07/20 0155 05/07/20 0343 05/07/20 0553 05/07/20 0722   BP:  138/68  129/77   Pulse: (!) 103 (!) 130 (!) 119 (!) 115   Resp:  28  24   Temp:  (!) 102.8 °F (39.3 °C) (!) 100.5 °F (38.1 °C) 98.8 °F (37.1 °C)   SpO2:  96%  96%   Weight:       Height:            Intake and Output:  Current Shift: No intake/output data recorded. Last three shifts: No intake/output data recorded. Physical Exam:   GENERAL: alert, cooperative, no distress, appears stated age  EYE: negative  LYMPHATIC: Cervical, supraclavicular, and axillary nodes normal.   THROAT & NECK: normal and no erythema or exudates noted. LUNG: mild rhonchi, no wheezing- on 2 liters NC, O2 95%  HEART: regular rate and rhythm, S1, S2 normal, no murmur, click, rub or gallop  ABDOMEN: soft, non-tender. Bowel sounds normal. No masses,  no organomegaly  EXTREMITIES:  extremities normal, atraumatic, no cyanosis or edema  SKIN: Normal.  NEUROLOGIC: negative  PSYCHIATRIC: non focal    Lab/Data Review: All lab results for the last 24 hours reviewed.        Assessment:     Patient Active Problem List   Diagnosis Code    Hypercholesterolemia E78.00    GERD (gastroesophageal reflux disease) K21.9    Chronic low back pain without sciatica M54.5, G89.29    Low testosterone in male R79.89    2019 novel coronavirusinfected pneumonia (NCIP) U07.1, J12.89    [facility] A41.89, B97.89    BMI 33.0-33.9,adult Z68.33    Hypoxia R09.02    COVID-19 virus detected U07.1       Plan:     #Novel coronavirus infected pneumonia, acute respiratory failure with hypoxia:   Patient tested positive for novel coronavirus on 5/2/2020.  -wean o2 pr  -Hydroxychloroquine oral  -Ascorbic acid, zinc sulfate  -Type and screen, may benefit from convalescent plasma, may contact Dr. Anna Wisdom in am   -Daily labs    -Hypokalemia: replace kcl po      Full Code     Enoxaparin for VTE prevention    Disposition: home once medically stable     Signed By: Curtis Flores MD     May 7, 2020

## 2020-05-08 LAB
ALBUMIN SERPL-MCNC: 3.2 G/DL (ref 3.5–5)
ALBUMIN/GLOB SERPL: 0.8 {RATIO} (ref 1.2–3.5)
ALP SERPL-CCNC: 65 U/L (ref 50–136)
ALT SERPL-CCNC: 61 U/L (ref 12–65)
ANION GAP SERPL CALC-SCNC: 7 MMOL/L (ref 7–16)
APTT PPP: 34 SEC (ref 24.3–35.4)
AST SERPL-CCNC: 51 U/L (ref 15–37)
ATRIAL RATE: 116 BPM
BASOPHILS # BLD: 0 K/UL (ref 0–0.2)
BASOPHILS NFR BLD: 0 % (ref 0–2)
BILIRUB SERPL-MCNC: 0.4 MG/DL (ref 0.2–1.1)
BUN SERPL-MCNC: 8 MG/DL (ref 6–23)
CALCIUM SERPL-MCNC: 8.4 MG/DL (ref 8.3–10.4)
CALCULATED P AXIS, ECG09: 39 DEGREES
CALCULATED R AXIS, ECG10: 35 DEGREES
CALCULATED T AXIS, ECG11: 18 DEGREES
CHLORIDE SERPL-SCNC: 102 MMOL/L (ref 98–107)
CO2 SERPL-SCNC: 28 MMOL/L (ref 21–32)
CREAT SERPL-MCNC: 0.8 MG/DL (ref 0.8–1.5)
D DIMER PPP FEU-MCNC: 0.49 UG/ML(FEU)
DIAGNOSIS, 93000: NORMAL
DIFFERENTIAL METHOD BLD: ABNORMAL
EOSINOPHIL # BLD: 0 K/UL (ref 0–0.8)
EOSINOPHIL NFR BLD: 0 % (ref 0.5–7.8)
ERYTHROCYTE [DISTWIDTH] IN BLOOD BY AUTOMATED COUNT: 13 % (ref 11.9–14.6)
FIBRINOGEN PPP-MCNC: 584 MG/DL (ref 190–501)
GLOBULIN SER CALC-MCNC: 4.1 G/DL (ref 2.3–3.5)
GLUCOSE SERPL-MCNC: 117 MG/DL (ref 65–100)
HCT VFR BLD AUTO: 42.7 % (ref 41.1–50.3)
HGB BLD-MCNC: 14 G/DL (ref 13.6–17.2)
IMM GRANULOCYTES # BLD AUTO: 0 K/UL (ref 0–0.5)
IMM GRANULOCYTES NFR BLD AUTO: 0 % (ref 0–5)
INR PPP: 0.9
LYMPHOCYTES # BLD: 0.8 K/UL (ref 0.5–4.6)
LYMPHOCYTES NFR BLD: 17 % (ref 13–44)
Lab: NORMAL
MAGNESIUM SERPL-MCNC: 2 MG/DL (ref 1.8–2.4)
MCH RBC QN AUTO: 28.1 PG (ref 26.1–32.9)
MCHC RBC AUTO-ENTMCNC: 32.8 G/DL (ref 31.4–35)
MCV RBC AUTO: 85.6 FL (ref 79.6–97.8)
MONOCYTES # BLD: 0.4 K/UL (ref 0.1–1.3)
MONOCYTES NFR BLD: 9 % (ref 4–12)
NEUTS SEG # BLD: 3.4 K/UL (ref 1.7–8.2)
NEUTS SEG NFR BLD: 74 % (ref 43–78)
NRBC # BLD: 0 K/UL (ref 0–0.2)
P-R INTERVAL, ECG05: 126 MS
PHOSPHATE SERPL-MCNC: 3.9 MG/DL (ref 2.5–4.5)
PLATELET # BLD AUTO: 155 K/UL (ref 150–450)
PMV BLD AUTO: 10.8 FL (ref 9.4–12.3)
POTASSIUM SERPL-SCNC: 3.6 MMOL/L (ref 3.5–5.1)
PROT SERPL-MCNC: 7.3 G/DL (ref 6.3–8.2)
PROTHROMBIN TIME: 12.5 SEC (ref 12–14.7)
Q-T INTERVAL, ECG07: 328 MS
QRS DURATION, ECG06: 86 MS
QTC CALCULATION (BEZET), ECG08: 455 MS
RBC # BLD AUTO: 4.99 M/UL (ref 4.23–5.6)
REFERENCE LAB,REFLB: NORMAL
SODIUM SERPL-SCNC: 137 MMOL/L (ref 136–145)
TEST DESCRIPTION:,ATST: NORMAL
VENTRICULAR RATE, ECG03: 116 BPM
WBC # BLD AUTO: 4.6 K/UL (ref 4.3–11.1)

## 2020-05-08 PROCEDURE — 93005 ELECTROCARDIOGRAM TRACING: CPT | Performed by: FAMILY MEDICINE

## 2020-05-08 PROCEDURE — 83735 ASSAY OF MAGNESIUM: CPT

## 2020-05-08 PROCEDURE — 87635 SARS-COV-2 COVID-19 AMP PRB: CPT

## 2020-05-08 PROCEDURE — 74011250637 HC RX REV CODE- 250/637: Performed by: INTERNAL MEDICINE

## 2020-05-08 PROCEDURE — 85025 COMPLETE CBC W/AUTO DIFF WBC: CPT

## 2020-05-08 PROCEDURE — 80053 COMPREHEN METABOLIC PANEL: CPT

## 2020-05-08 PROCEDURE — 74011250637 HC RX REV CODE- 250/637: Performed by: FAMILY MEDICINE

## 2020-05-08 PROCEDURE — 84100 ASSAY OF PHOSPHORUS: CPT

## 2020-05-08 PROCEDURE — 85384 FIBRINOGEN ACTIVITY: CPT

## 2020-05-08 PROCEDURE — 65270000029 HC RM PRIVATE

## 2020-05-08 PROCEDURE — 85610 PROTHROMBIN TIME: CPT

## 2020-05-08 PROCEDURE — 73060999999 HC MISC LAB CHARGE

## 2020-05-08 PROCEDURE — 85730 THROMBOPLASTIN TIME PARTIAL: CPT

## 2020-05-08 PROCEDURE — 85379 FIBRIN DEGRADATION QUANT: CPT

## 2020-05-08 RX ADMIN — Medication 220 MG: at 08:36

## 2020-05-08 RX ADMIN — ACETAMINOPHEN 650 MG: 325 TABLET, FILM COATED ORAL at 01:11

## 2020-05-08 RX ADMIN — HYDROXYCHLOROQUINE SULFATE 200 MG: 200 TABLET ORAL at 01:11

## 2020-05-08 RX ADMIN — Medication 10 ML: at 13:28

## 2020-05-08 RX ADMIN — Medication 10 ML: at 22:29

## 2020-05-08 RX ADMIN — OXYCODONE HYDROCHLORIDE AND ACETAMINOPHEN 1500 MG: 500 TABLET ORAL at 16:08

## 2020-05-08 RX ADMIN — ACETAMINOPHEN 650 MG: 325 TABLET, FILM COATED ORAL at 16:07

## 2020-05-08 RX ADMIN — Medication 10 ML: at 06:23

## 2020-05-08 RX ADMIN — PANTOPRAZOLE SODIUM 40 MG: 40 TABLET, DELAYED RELEASE ORAL at 06:22

## 2020-05-08 RX ADMIN — ACETAMINOPHEN 650 MG: 325 TABLET, FILM COATED ORAL at 08:35

## 2020-05-08 RX ADMIN — OXYCODONE HYDROCHLORIDE AND ACETAMINOPHEN 1500 MG: 500 TABLET ORAL at 12:14

## 2020-05-08 RX ADMIN — HYDROXYCHLOROQUINE SULFATE 200 MG: 200 TABLET ORAL at 13:28

## 2020-05-08 RX ADMIN — OXYCODONE HYDROCHLORIDE AND ACETAMINOPHEN 1500 MG: 500 TABLET ORAL at 06:22

## 2020-05-08 NOTE — PROGRESS NOTES
Pt given tylenol for HA and mild pain. O2 removed pt tolerating well O2 sat is 91%  no c/o increased SOB will continue to monitor.

## 2020-05-08 NOTE — PROGRESS NOTES
Pt received 650mg, 2 tabs of Tylenol for complaint of head pain and some pain in his throat 6/10. Will continue to monitor.

## 2020-05-08 NOTE — PROGRESS NOTES
Pt in room alert and oriented rr are even and unlabored O2 3L NC in place lung sounds are diminished with some wheezing noted. abd is soft bowel soft are active.skin intact no new issues noted. Safety measures in place will continue to monitor.

## 2020-05-08 NOTE — PROGRESS NOTES
Pt is resting in bed at this time. Pt has no s/sx of distress at this time. Pt is on 2L NC. Pt has even and unlabored respirations. Report has been given to oncoming nurse.

## 2020-05-08 NOTE — PROGRESS NOTES
Pt is improving recently was weaned off O2 and is maintaining  Sats above 90%. Pt self-ambulates in the room. Please consult  if any new issues arise. Case Management will continue to follow. DISPLAY PLAN FREE TEXT

## 2020-05-08 NOTE — PROGRESS NOTES
Pt states that he is feeling better with his pain but wishes that he could sleep. Pt has not slept at all tonight.  Dr. Juana Matias was informed that pt was requesting sleep medication at beginning of shift and Dr. Juana Matias declined the request.

## 2020-05-08 NOTE — PROGRESS NOTES
Hospital  is available for over-the-phone language services. Please call [phone] (DT) or [phone] (ES) for all requests 24/7. Virtual rounding, Interpreting services offered to assist with any requests.           Thank you,      Martha Linn,   Rowdy Vinson [address]  [phone] (phone)

## 2020-05-08 NOTE — PROGRESS NOTES
Received bedside shift report from Katrina Saul RN. Pt lying in bed. No apparent distress. Respirations even and unlabored. Instructed to call for assistance with needs, as they arise. Pt voiced understanding.

## 2020-05-08 NOTE — PROGRESS NOTES
Request by treating physician/team  for COVID-19 convalescent plasma based on patient meeting one of the following criteria, which I verified by record review:   1. Age at least 25 years   2. Laboratory confirmed diagnosis of infection with SARS-CoV-2   3. Admitted to an acute care facility for the treatment of [id number] complications   4. Severe or life threatening COVID-19, or judged by the treating provider to be at high risk of progression to severe or life-threatening disease   5. Informed consent provided by the patient or healthcare proxy   Severe COVID-19 is defined by one or more of the following:  dyspnea  respiratory frequency ? 30/min  blood oxygen saturation ? 93%  partial pressure of arterial oxygen to fraction of inspired oxygen ratio < 300  lung infiltrates > 50% within 24 to 48 hours Life-threatening COVID-19 is defined as one or more of the following:    respiratory failure  septic shock  multiple organ dysfunction or failure     Consent was obtained by treating physician/team and patient was registered and assigned patient code. Communicated with blood bank and 1 unit of blood type specific COVID convalescent plasma was ordered for the patient. As per study protocol, adverse events will be monitored and reported and all regulatory procedures will be followed.     Tutu Sierra MD  Director, [facility] and Blood Disorders Program  [id number] [place] [address] Phone

## 2020-05-08 NOTE — PROGRESS NOTES
rounded on patient with nurse. Interpreting services offered when needed. Hospital  is available 24/7 for over-the-phone language services. Please call [phone] (DT) or [phone] (ES) for all requests.         [facility]  Language Services Department  [facility]  [address]  [phone] (phone)

## 2020-05-08 NOTE — PROGRESS NOTES
Pt is alert and oriented times 4. Pt understands some English without the need of . Pt is on 2L NC. Pt states that his throat is a little dry which prevented his from sleeping well last night. Pt has requested something to help him sleep tonight. Pt ha no other complaints of pain. Pt is COVID positive so he is on droplet plus isolation. Pt has call light within reach and expresses that he understands to call for assistance if needed.

## 2020-05-08 NOTE — PROGRESS NOTES
05/08/20 1122   Vital Signs   Temp 99.6 °F (37.6 °C)   Temp Source Oral   Pulse (Heart Rate) (!) 111   Resp Rate 16   O2 Sat (%) 95 %   Level of Consciousness Alert   /83   MAP (Calculated) 98   BP 1 Method Automatic   MEWS Score 3

## 2020-05-08 NOTE — PROGRESS NOTES
Progress Note    Patient: Lashell Corona MRN: [id number]  SSN: [id number]    YOB: 1985  Age: 29 y.o. Sex: male      Admit Date: 5/6/2020    LOS: 1 day     Subjective:   He was found in no distress. On room air, but with very swallow breathing. He remains tachycardic and with low grade fever. Objective:     Vitals:    05/07/20 2000 05/07/20 2354 05/08/20 0350 05/08/20 0752   BP: 162/90 130/76 146/76 125/81   Pulse: (!) 114 (!) 115 (!) 101 (!) 104   Resp: 21 20 19 17   Temp: 100.4 °F (38 °C) 100.2 °F (37.9 °C) 98.2 °F (36.8 °C) 98.7 °F (37.1 °C)   SpO2: 95% 95% 94% 95%   Weight:       Height:            Intake and Output:  Current Shift: No intake/output data recorded. Last three shifts: 05/06 1901 - 05/08 0700  In: 480 [P.O.:480]  Out: -     Physical Exam:   GENERAL: alert, cooperative, no distress, appears stated age  EYE: negative  LYMPHATIC: Cervical, supraclavicular, and axillary nodes normal.   THROAT & NECK: normal and no erythema or exudates noted. LUNG: mild rhonchi, no wheezing- on room air   HEART: regular rate and rhythm, S1, S2 normal, no murmur, click, rub or gallop  ABDOMEN: soft, non-tender. Bowel sounds normal. No masses,  no organomegaly  EXTREMITIES:  extremities normal, atraumatic, no cyanosis or edema  SKIN: Normal.  NEUROLOGIC: negative  PSYCHIATRIC: non focal    Lab/Data Review: All lab results for the last 24 hours reviewed.        Assessment:     Patient Active Problem List   Diagnosis Code    Hypercholesterolemia E78.00    GERD (gastroesophageal reflux disease) K21.9    Chronic low back pain without sciatica M54.5, G89.29    Low testosterone in male R79.89    2019 novel coronavirusinfected pneumonia (NCIP) U07.1, J12.89    [facility] A41.89, B97.89    BMI 33.0-33.9,adult Z68.33    Hypoxia R09.02    COVID-19 virus detected U07.1       Plan:     #Novel coronavirus infected pneumonia, acute respiratory failure with hypoxia:   Patient tested positive for novel coronavirus on 5/2/2020. -on room air  -Hydroxychloroquine oral  -Ascorbic acid, zinc sulfate  -Plan for 1 units of convalescent plasma Saturday.  Dr. Fidel Stevenson on board   -Daily labs    #Sinus tachycardia: multifactorial  Monitor      Full Code     Enoxaparin for VTE prevention    Disposition: home once medically stable     Signed By: Fabricio Phoenix MD     May 8, 2020

## 2020-05-09 ENCOUNTER — APPOINTMENT (OUTPATIENT)
Dept: GENERAL RADIOLOGY | Age: 35
DRG: 871 | End: 2020-05-09
Attending: INTERNAL MEDICINE
Payer: COMMERCIAL

## 2020-05-09 LAB
APTT PPP: 34 SEC (ref 24.3–35.4)
D DIMER PPP FEU-MCNC: 0.56 UG/ML(FEU)
FIBRINOGEN PPP-MCNC: 667 MG/DL (ref 190–501)
GLUCOSE BLD STRIP.AUTO-MCNC: 124 MG/DL (ref 65–100)
INR PPP: 0.9
MAGNESIUM SERPL-MCNC: 1.6 MG/DL (ref 1.8–2.4)
PROTHROMBIN TIME: 12.7 SEC (ref 12–14.7)

## 2020-05-09 PROCEDURE — 85730 THROMBOPLASTIN TIME PARTIAL: CPT

## 2020-05-09 PROCEDURE — 82962 GLUCOSE BLOOD TEST: CPT

## 2020-05-09 PROCEDURE — 36430 TRANSFUSION BLD/BLD COMPNT: CPT

## 2020-05-09 PROCEDURE — 74011250637 HC RX REV CODE- 250/637: Performed by: FAMILY MEDICINE

## 2020-05-09 PROCEDURE — 85610 PROTHROMBIN TIME: CPT

## 2020-05-09 PROCEDURE — 65270000029 HC RM PRIVATE

## 2020-05-09 PROCEDURE — 74011250636 HC RX REV CODE- 250/636: Performed by: FAMILY MEDICINE

## 2020-05-09 PROCEDURE — 71045 X-RAY EXAM CHEST 1 VIEW: CPT

## 2020-05-09 PROCEDURE — 74011250637 HC RX REV CODE- 250/637: Performed by: INTERNAL MEDICINE

## 2020-05-09 PROCEDURE — 74011250636 HC RX REV CODE- 250/636: Performed by: INTERNAL MEDICINE

## 2020-05-09 PROCEDURE — 83735 ASSAY OF MAGNESIUM: CPT

## 2020-05-09 PROCEDURE — 85384 FIBRINOGEN ACTIVITY: CPT

## 2020-05-09 PROCEDURE — 85379 FIBRIN DEGRADATION QUANT: CPT

## 2020-05-09 RX ORDER — LORAZEPAM 1 MG/1
1 TABLET ORAL ONCE
Status: COMPLETED | OUTPATIENT
Start: 2020-05-09 | End: 2020-05-09

## 2020-05-09 RX ORDER — MAGNESIUM SULFATE HEPTAHYDRATE 40 MG/ML
2 INJECTION, SOLUTION INTRAVENOUS ONCE
Status: COMPLETED | OUTPATIENT
Start: 2020-05-09 | End: 2020-05-09

## 2020-05-09 RX ORDER — SODIUM CHLORIDE 9 MG/ML
250 INJECTION, SOLUTION INTRAVENOUS AS NEEDED
Status: DISCONTINUED | OUTPATIENT
Start: 2020-05-09 | End: 2020-05-12 | Stop reason: HOSPADM

## 2020-05-09 RX ORDER — LORAZEPAM 0.5 MG/1
0.5 TABLET ORAL
Status: DISCONTINUED | OUTPATIENT
Start: 2020-05-09 | End: 2020-05-12 | Stop reason: HOSPADM

## 2020-05-09 RX ORDER — GUAIFENESIN 100 MG/5ML
400 SOLUTION ORAL
Status: DISCONTINUED | OUTPATIENT
Start: 2020-05-09 | End: 2020-05-12 | Stop reason: HOSPADM

## 2020-05-09 RX ADMIN — Medication 220 MG: at 08:39

## 2020-05-09 RX ADMIN — LORAZEPAM 0.5 MG: 0.5 TABLET ORAL at 11:14

## 2020-05-09 RX ADMIN — ENOXAPARIN SODIUM 40 MG: 40 INJECTION SUBCUTANEOUS at 00:04

## 2020-05-09 RX ADMIN — OXYCODONE HYDROCHLORIDE AND ACETAMINOPHEN 1500 MG: 500 TABLET ORAL at 11:18

## 2020-05-09 RX ADMIN — PANTOPRAZOLE SODIUM 40 MG: 40 TABLET, DELAYED RELEASE ORAL at 05:09

## 2020-05-09 RX ADMIN — LORAZEPAM 1 MG: 1 TABLET ORAL at 04:48

## 2020-05-09 RX ADMIN — HYDROXYCHLOROQUINE SULFATE 200 MG: 200 TABLET ORAL at 21:53

## 2020-05-09 RX ADMIN — OXYCODONE HYDROCHLORIDE AND ACETAMINOPHEN 1500 MG: 500 TABLET ORAL at 18:04

## 2020-05-09 RX ADMIN — ACETAMINOPHEN 650 MG: 325 TABLET, FILM COATED ORAL at 11:14

## 2020-05-09 RX ADMIN — ACETAMINOPHEN 650 MG: 325 TABLET, FILM COATED ORAL at 22:56

## 2020-05-09 RX ADMIN — ENOXAPARIN SODIUM 40 MG: 40 INJECTION SUBCUTANEOUS at 21:53

## 2020-05-09 RX ADMIN — Medication 10 ML: at 14:34

## 2020-05-09 RX ADMIN — LORAZEPAM 0.5 MG: 0.5 TABLET ORAL at 22:56

## 2020-05-09 RX ADMIN — OXYCODONE HYDROCHLORIDE AND ACETAMINOPHEN 1500 MG: 500 TABLET ORAL at 05:09

## 2020-05-09 RX ADMIN — OXYCODONE HYDROCHLORIDE AND ACETAMINOPHEN 1500 MG: 500 TABLET ORAL at 21:53

## 2020-05-09 RX ADMIN — Medication 10 ML: at 05:13

## 2020-05-09 RX ADMIN — MAGNESIUM SULFATE IN WATER 2 G: 40 INJECTION, SOLUTION INTRAVENOUS at 08:39

## 2020-05-09 RX ADMIN — HYDROXYCHLOROQUINE SULFATE 200 MG: 200 TABLET ORAL at 14:34

## 2020-05-09 RX ADMIN — OXYCODONE HYDROCHLORIDE AND ACETAMINOPHEN 1500 MG: 500 TABLET ORAL at 00:08

## 2020-05-09 RX ADMIN — HYDROXYCHLOROQUINE SULFATE 200 MG: 200 TABLET ORAL at 00:09

## 2020-05-09 NOTE — PROGRESS NOTES
Problem: Falls - Risk of  Goal: *Absence of Falls  Description: Document Dana Neves Fall Risk and appropriate interventions in the flowsheet.   Outcome: Progressing Towards Goal  Note: Fall Risk Interventions:    Medication Interventions: Teach patient to arise slowly, Patient to call before getting OOB     Problem: Breathing Pattern - Ineffective  Goal: *Absence of hypoxia  Outcome: Progressing Towards Goal  Goal: *Use of effective breathing techniques  Outcome: Progressing Towards Goal

## 2020-05-09 NOTE — PROGRESS NOTES
Pt received plasma today and tolerated well. Feeling better, not as anxious. Sitting in chair, A&Ox4. Denies pain at this time. IV SL. Hopes to go home tomorrow.

## 2020-05-09 NOTE — PROGRESS NOTES
Progress Note    Patient: Wendy Correia MRN: [id number]  SSN: [id number]    YOB: 1985  Age: 29 y.o. Sex: male      Admit Date: 5/6/2020    LOS: 2 days     Subjective: The patient still feels weak, out of breath. He has spiked fever today. He has insomnia and is requesting a medication for sleep. Objective:     Vitals:    05/09/20 0022 05/09/20 0328 05/09/20 0430 05/09/20 0741   BP: 148/79 139/82  (!) 174/91   Pulse: (!) 115 (!) 115  (!) 111   Resp: 20 (!) 32 20 20   Temp: 100.1 °F (37.8 °C) 98.3 °F (36.8 °C)  99.9 °F (37.7 °C)   SpO2: 93% 92%  95%   Weight:       Height:            Intake and Output:  Current Shift: No intake/output data recorded. Last three shifts: 05/07 1901 - 05/09 0700  In: 180 [P.O.:180]  Out: -     Physical Exam:   GENERAL: alert, cooperative, no distress, appears stated age  EYE: negative  LYMPHATIC: Cervical, supraclavicular, and axillary nodes normal.   THROAT & NECK: normal and no erythema or exudates noted. LUNG: mild rhonchi, no wheezing- on 2 liters NC.  HEART: regular rate and rhythm, S1, S2 normal, no murmur, click, rub or gallop  ABDOMEN: soft, non-tender. Bowel sounds normal. No masses,  no organomegaly  EXTREMITIES:  extremities normal, atraumatic, no cyanosis or edema  SKIN: Normal.  NEUROLOGIC: negative  PSYCHIATRIC: non focal    Lab/Data Review: All lab results for the last 24 hours reviewed.        Assessment:     Patient Active Problem List   Diagnosis Code    Hypercholesterolemia E78.00    GERD (gastroesophageal reflux disease) K21.9    Chronic low back pain without sciatica M54.5, G89.29    Low testosterone in male R79.89    2019 novel coronavirusinfected pneumonia (NCIP) U07.1, J12.89    Viral sepsis ([facility] A41.89, B97.89    BMI 33.0-33.9,adult Z68.33    Hypoxia R09.02    COVID-19 virus detected U07.1       Plan:     #Novel coronavirus infected pneumonia, acute respiratory failure with hypoxia:   Patient tested positive for novel coronavirus on 5/2/2020.  -back on NC-wean prn   -Hydroxychloroquine oral  -Ascorbic acid, zinc sulfate  -Plan for 1 unit of convalescent plasma today   -Daily labs    #Sinus tachycardia: multifactorial  Monitor     #Hypomagnesemia: replace 2 gr iv mag     Full Code     Enoxaparin for VTE prevention    Disposition: home once medically stable     Signed By: Dolores Rubio MD     May 9, 2020

## 2020-05-10 LAB
ALBUMIN SERPL-MCNC: 1.5 G/DL (ref 3.5–5)
ALBUMIN/GLOB SERPL: 0.3 {RATIO} (ref 1.2–3.5)
ALP SERPL-CCNC: 70 U/L (ref 50–136)
ALT SERPL-CCNC: 59 U/L (ref 12–65)
ANION GAP SERPL CALC-SCNC: 16 MMOL/L (ref 7–16)
APTT PPP: 33.7 SEC (ref 24.3–35.4)
AST SERPL-CCNC: 35 U/L (ref 15–37)
ATRIAL RATE: 111 BPM
BASOPHILS # BLD: 0 K/UL (ref 0–0.2)
BASOPHILS NFR BLD: 0 % (ref 0–2)
BILIRUB SERPL-MCNC: 0.7 MG/DL (ref 0.2–1.1)
BLD PROD TYP BPU: NORMAL
BLOOD BANK CMNT PATIENT-IMP: NORMAL
BPU ID: NORMAL
BUN SERPL-MCNC: 13 MG/DL (ref 6–23)
CALCIUM SERPL-MCNC: 8.3 MG/DL (ref 8.3–10.4)
CALCULATED P AXIS, ECG09: 66 DEGREES
CALCULATED R AXIS, ECG10: 95 DEGREES
CALCULATED T AXIS, ECG11: 35 DEGREES
CHLORIDE SERPL-SCNC: 102 MMOL/L (ref 98–107)
CO2 SERPL-SCNC: 21 MMOL/L (ref 21–32)
CREAT SERPL-MCNC: 0.78 MG/DL (ref 0.8–1.5)
D DIMER PPP FEU-MCNC: 0.47 UG/ML(FEU)
DIAGNOSIS, 93000: NORMAL
DIFFERENTIAL METHOD BLD: ABNORMAL
EOSINOPHIL # BLD: 0 K/UL (ref 0–0.8)
EOSINOPHIL NFR BLD: 0 % (ref 0.5–7.8)
ERYTHROCYTE [DISTWIDTH] IN BLOOD BY AUTOMATED COUNT: 12.8 % (ref 11.9–14.6)
FIBRINOGEN PPP-MCNC: 702 MG/DL (ref 190–501)
GLOBULIN SER CALC-MCNC: 5.3 G/DL (ref 2.3–3.5)
GLUCOSE SERPL-MCNC: 121 MG/DL (ref 65–100)
HCT VFR BLD AUTO: 40.5 % (ref 41.1–50.3)
HGB BLD-MCNC: 13.7 G/DL (ref 13.6–17.2)
IMM GRANULOCYTES # BLD AUTO: 0 K/UL (ref 0–0.5)
IMM GRANULOCYTES NFR BLD AUTO: 1 % (ref 0–5)
INR PPP: 1
LYMPHOCYTES # BLD: 0.6 K/UL (ref 0.5–4.6)
LYMPHOCYTES NFR BLD: 9 % (ref 13–44)
MAGNESIUM SERPL-MCNC: 2 MG/DL (ref 1.8–2.4)
MCH RBC QN AUTO: 28.5 PG (ref 26.1–32.9)
MCHC RBC AUTO-ENTMCNC: 33.8 G/DL (ref 31.4–35)
MCV RBC AUTO: 84.4 FL (ref 79.6–97.8)
MONOCYTES # BLD: 0.6 K/UL (ref 0.1–1.3)
MONOCYTES NFR BLD: 9 % (ref 4–12)
NEUTS SEG # BLD: 5.5 K/UL (ref 1.7–8.2)
NEUTS SEG NFR BLD: 81 % (ref 43–78)
NRBC # BLD: 0 K/UL (ref 0–0.2)
P-R INTERVAL, ECG05: 150 MS
PHOSPHATE SERPL-MCNC: 3.5 MG/DL (ref 2.5–4.5)
PLATELET # BLD AUTO: 181 K/UL (ref 150–450)
PMV BLD AUTO: 11 FL (ref 9.4–12.3)
POTASSIUM SERPL-SCNC: 3.5 MMOL/L (ref 3.5–5.1)
PROT SERPL-MCNC: 6.8 G/DL (ref 6.3–8.2)
PROTHROMBIN TIME: 13.4 SEC (ref 12–14.7)
Q-T INTERVAL, ECG07: 338 MS
QRS DURATION, ECG06: 96 MS
QTC CALCULATION (BEZET), ECG08: 459 MS
RBC # BLD AUTO: 4.8 M/UL (ref 4.23–5.6)
SODIUM SERPL-SCNC: 139 MMOL/L (ref 136–145)
STATUS OF UNIT,%ST: NORMAL
UNIT DIVISION, %UDIV: 0
VENTRICULAR RATE, ECG03: 111 BPM
WBC # BLD AUTO: 6.7 K/UL (ref 4.3–11.1)

## 2020-05-10 PROCEDURE — 85610 PROTHROMBIN TIME: CPT

## 2020-05-10 PROCEDURE — 85379 FIBRIN DEGRADATION QUANT: CPT

## 2020-05-10 PROCEDURE — 83735 ASSAY OF MAGNESIUM: CPT

## 2020-05-10 PROCEDURE — 85730 THROMBOPLASTIN TIME PARTIAL: CPT

## 2020-05-10 PROCEDURE — 80053 COMPREHEN METABOLIC PANEL: CPT

## 2020-05-10 PROCEDURE — 93005 ELECTROCARDIOGRAM TRACING: CPT | Performed by: FAMILY MEDICINE

## 2020-05-10 PROCEDURE — 74011250637 HC RX REV CODE- 250/637: Performed by: INTERNAL MEDICINE

## 2020-05-10 PROCEDURE — 85384 FIBRINOGEN ACTIVITY: CPT

## 2020-05-10 PROCEDURE — 85025 COMPLETE CBC W/AUTO DIFF WBC: CPT

## 2020-05-10 PROCEDURE — 65270000029 HC RM PRIVATE

## 2020-05-10 PROCEDURE — 74011250637 HC RX REV CODE- 250/637: Performed by: FAMILY MEDICINE

## 2020-05-10 PROCEDURE — 36415 COLL VENOUS BLD VENIPUNCTURE: CPT

## 2020-05-10 PROCEDURE — 84100 ASSAY OF PHOSPHORUS: CPT

## 2020-05-10 PROCEDURE — 74011250636 HC RX REV CODE- 250/636: Performed by: FAMILY MEDICINE

## 2020-05-10 RX ADMIN — OXYCODONE HYDROCHLORIDE AND ACETAMINOPHEN 1500 MG: 500 TABLET ORAL at 05:25

## 2020-05-10 RX ADMIN — Medication 220 MG: at 09:24

## 2020-05-10 RX ADMIN — Medication 10 ML: at 14:25

## 2020-05-10 RX ADMIN — PANTOPRAZOLE SODIUM 40 MG: 40 TABLET, DELAYED RELEASE ORAL at 05:25

## 2020-05-10 RX ADMIN — ACETAMINOPHEN 650 MG: 325 TABLET, FILM COATED ORAL at 22:25

## 2020-05-10 RX ADMIN — OXYCODONE HYDROCHLORIDE AND ACETAMINOPHEN 1500 MG: 500 TABLET ORAL at 23:00

## 2020-05-10 RX ADMIN — Medication 10 ML: at 05:26

## 2020-05-10 RX ADMIN — Medication 1 SPRAY: at 22:25

## 2020-05-10 RX ADMIN — HYDROXYCHLOROQUINE SULFATE 200 MG: 200 TABLET ORAL at 12:18

## 2020-05-10 RX ADMIN — ACETAMINOPHEN 650 MG: 325 TABLET, FILM COATED ORAL at 07:45

## 2020-05-10 RX ADMIN — GUAIFENESIN 400 MG: 100 SOLUTION ORAL at 05:26

## 2020-05-10 RX ADMIN — OXYCODONE HYDROCHLORIDE AND ACETAMINOPHEN 1500 MG: 500 TABLET ORAL at 17:33

## 2020-05-10 RX ADMIN — HYDROXYCHLOROQUINE SULFATE 200 MG: 200 TABLET ORAL at 23:00

## 2020-05-10 RX ADMIN — ENOXAPARIN SODIUM 40 MG: 40 INJECTION SUBCUTANEOUS at 22:27

## 2020-05-10 RX ADMIN — Medication 10 ML: at 22:27

## 2020-05-10 RX ADMIN — Medication 2 SPRAY: at 23:00

## 2020-05-10 RX ADMIN — OXYCODONE HYDROCHLORIDE AND ACETAMINOPHEN 1500 MG: 500 TABLET ORAL at 12:18

## 2020-05-10 NOTE — PROGRESS NOTES
Problem: Falls - Risk of  Goal: *Absence of Falls  Description: Document Martin Sol Fall Risk and appropriate interventions in the flowsheet. Outcome: Progressing Towards Goal  Note: Fall Risk Interventions:    Medication Interventions: Teach patient to arise slowly  History of Falls Interventions:  Investigate reason for fall         Problem: Breathing Pattern - Ineffective  Goal: *Absence of hypoxia  Outcome: Progressing Towards Goal  Goal: *Use of effective breathing techniques  Outcome: Progressing Towards Goal                   Using IS: couple times an hours

## 2020-05-10 NOTE — PROGRESS NOTES
Hourly rounds completed. All needs met. Pt c/o anxious and sputum production in lungs. Interventions per MAR. Morning labs were unsuccessful drawn by this nurse. Requested for lab to draw labs. Gave report to the oncoming day shift nurse.

## 2020-05-10 NOTE — PROGRESS NOTES
Progress Note    Patient: Elin Weston MRN: [id number]  SSN: [id number]    YOB: 1985  Age: 29 y.o. Sex: male      Admit Date: 5/6/2020    LOS: 3 days      Mr. Zana Pal is a 29year old male with no PMH, who had a positive novel coronavirus infection on 5/2/2020. He has had progressive symptoms such as sob, cough and fever. He was noted hypoxic in the ED and was admitted with sepsis criteria. The patient required supplemental oxygen, antibiotics, Plaquenil and convalescent plasma. He persists tachycardic, febrile. Subjective:   He feels fever. I explained that fever will still be present for a few more days. He has on and off episodes of sob, but he remains on room air. Objective:     Vitals:    05/10/20 0858 05/10/20 1108 05/10/20 1200 05/10/20 1459   BP:  116/71  92/61   Pulse: (!) 104 (!) 108 (!) 107 (!) 112   Resp:  19  19   Temp:  98.8 °F (37.1 °C)  99 °F (37.2 °C)   SpO2:  95%  95%   Weight:       Height:            Intake and Output:  Current Shift: 05/10 0701 - 05/10 1900  In: 240 [P.O.:240]  Out: -   Last three shifts: 05/08 1901 - 05/10 0700  In: 1507.6 [P.O.:1050]  Out: -     Physical Exam:   GENERAL: alert, cooperative, no distress, appears stated age  EYE: negative  LYMPHATIC: Cervical, supraclavicular, and axillary nodes normal.   THROAT & NECK: normal and no erythema or exudates noted. LUNG: mild rhonchi, no wheezing- on room air  HEART: regular rate and rhythm, S1, S2 normal, no murmur, click, rub or gallop  ABDOMEN: soft, non-tender. Bowel sounds normal. No masses,  no organomegaly  EXTREMITIES:  extremities normal, atraumatic, no cyanosis or edema  SKIN: Normal.  NEUROLOGIC: negative  PSYCHIATRIC: non focal    Lab/Data Review: All lab results for the last 24 hours reviewed.        Assessment:     Patient Active Problem List   Diagnosis Code    Hypercholesterolemia E78.00    GERD (gastroesophageal reflux disease) K21.9    Chronic low back pain without sciatica M54.5, G89.29    Low testosterone in male R79.89    2019 novel coronavirusinfected pneumonia (NCIP) U07.1, J12.89    Viral sepsis ([facility]) A41.89, B97.89    BMI 33.0-33.9,adult Z68.33    Hypoxia R09.02    COVID-19 virus detected U07.1       Plan:     #Novel coronavirus infected pneumonia, acute respiratory failure with hypoxia:   Patient tested positive for novel coronavirus on 5/2/2020.   -on room air  -Hydroxychloroquine oral  -Ascorbic acid, zinc sulfate  -s/p 1 unit of convalescent plasma 5/9  -Daily labs    #Sinus tachycardia: multifactorial  Monitor     #Hypomagnesemia: replaced      Full Code     Enoxaparin for VTE prevention    Disposition: home possible tomorrow if he is afebrile     Signed By: Joshua Arroyo MD     May 10, 2020

## 2020-05-11 VITALS
WEIGHT: 240 LBS | RESPIRATION RATE: 18 BRPM | HEART RATE: 92 BPM | BODY MASS INDEX: 33.6 KG/M2 | SYSTOLIC BLOOD PRESSURE: 127 MMHG | TEMPERATURE: 98.2 F | HEIGHT: 71 IN | OXYGEN SATURATION: 92 % | DIASTOLIC BLOOD PRESSURE: 87 MMHG

## 2020-05-11 LAB
ALBUMIN SERPL-MCNC: 2.5 G/DL (ref 3.5–5)
ALBUMIN/GLOB SERPL: 0.4 {RATIO} (ref 1.2–3.5)
ALP SERPL-CCNC: 74 U/L (ref 50–136)
ALT SERPL-CCNC: 59 U/L (ref 12–65)
ANION GAP SERPL CALC-SCNC: 12 MMOL/L (ref 7–16)
AST SERPL-CCNC: 35 U/L (ref 15–37)
BACTERIA SPEC CULT: NORMAL
BACTERIA SPEC CULT: NORMAL
BASOPHILS # BLD: 0 K/UL (ref 0–0.2)
BASOPHILS NFR BLD: 0 % (ref 0–2)
BILIRUB SERPL-MCNC: 0.7 MG/DL (ref 0.2–1.1)
BUN SERPL-MCNC: 14 MG/DL (ref 6–23)
CALCIUM SERPL-MCNC: 9 MG/DL (ref 8.3–10.4)
CHLORIDE SERPL-SCNC: 103 MMOL/L (ref 98–107)
CO2 SERPL-SCNC: 24 MMOL/L (ref 21–32)
CREAT SERPL-MCNC: 0.84 MG/DL (ref 0.8–1.5)
DIFFERENTIAL METHOD BLD: ABNORMAL
EOSINOPHIL # BLD: 0 K/UL (ref 0–0.8)
EOSINOPHIL NFR BLD: 0 % (ref 0.5–7.8)
ERYTHROCYTE [DISTWIDTH] IN BLOOD BY AUTOMATED COUNT: 12.6 % (ref 11.9–14.6)
GLOBULIN SER CALC-MCNC: 5.6 G/DL (ref 2.3–3.5)
GLUCOSE SERPL-MCNC: 102 MG/DL (ref 65–100)
HCT VFR BLD AUTO: 44.1 % (ref 41.1–50.3)
HGB BLD-MCNC: 14.4 G/DL (ref 13.6–17.2)
IMM GRANULOCYTES # BLD AUTO: 0.1 K/UL (ref 0–0.5)
IMM GRANULOCYTES NFR BLD AUTO: 1 % (ref 0–5)
LYMPHOCYTES # BLD: 1 K/UL (ref 0.5–4.6)
LYMPHOCYTES NFR BLD: 12 % (ref 13–44)
MAGNESIUM SERPL-MCNC: 2.1 MG/DL (ref 1.8–2.4)
MCH RBC QN AUTO: 28.1 PG (ref 26.1–32.9)
MCHC RBC AUTO-ENTMCNC: 32.7 G/DL (ref 31.4–35)
MCV RBC AUTO: 86.1 FL (ref 79.6–97.8)
MONOCYTES # BLD: 1 K/UL (ref 0.1–1.3)
MONOCYTES NFR BLD: 12 % (ref 4–12)
NEUTS SEG # BLD: 6.2 K/UL (ref 1.7–8.2)
NEUTS SEG NFR BLD: 75 % (ref 43–78)
NRBC # BLD: 0 K/UL (ref 0–0.2)
PLATELET # BLD AUTO: 275 K/UL (ref 150–450)
PMV BLD AUTO: 10.6 FL (ref 9.4–12.3)
POTASSIUM SERPL-SCNC: 3.5 MMOL/L (ref 3.5–5.1)
PROT SERPL-MCNC: 8.1 G/DL (ref 6.3–8.2)
RBC # BLD AUTO: 5.12 M/UL (ref 4.23–5.6)
SERVICE CMNT-IMP: NORMAL
SERVICE CMNT-IMP: NORMAL
SODIUM SERPL-SCNC: 139 MMOL/L (ref 136–145)
WBC # BLD AUTO: 8.3 K/UL (ref 4.3–11.1)

## 2020-05-11 PROCEDURE — 77010033678 HC OXYGEN DAILY

## 2020-05-11 PROCEDURE — 74011250637 HC RX REV CODE- 250/637: Performed by: FAMILY MEDICINE

## 2020-05-11 PROCEDURE — 80053 COMPREHEN METABOLIC PANEL: CPT

## 2020-05-11 PROCEDURE — 85025 COMPLETE CBC W/AUTO DIFF WBC: CPT

## 2020-05-11 PROCEDURE — 74011250637 HC RX REV CODE- 250/637: Performed by: INTERNAL MEDICINE

## 2020-05-11 PROCEDURE — 83735 ASSAY OF MAGNESIUM: CPT

## 2020-05-11 PROCEDURE — 36415 COLL VENOUS BLD VENIPUNCTURE: CPT

## 2020-05-11 RX ORDER — ACETAMINOPHEN 325 MG/1
650 TABLET ORAL
Qty: 30 TAB | Refills: 0 | Status: SHIPPED | OUTPATIENT
Start: 2020-05-11 | End: 2022-04-18

## 2020-05-11 RX ORDER — ONDANSETRON 4 MG/1
4 TABLET, ORALLY DISINTEGRATING ORAL
Qty: 20 TAB | Refills: 0 | Status: SHIPPED | OUTPATIENT
Start: 2020-05-11 | End: 2022-04-18

## 2020-05-11 RX ORDER — GUAIFENESIN 100 MG/5ML
400 SOLUTION ORAL
Qty: 1 BOTTLE | Refills: 1 | Status: SHIPPED | OUTPATIENT
Start: 2020-05-11 | End: 2022-04-18

## 2020-05-11 RX ADMIN — HYDROXYCHLOROQUINE SULFATE 200 MG: 200 TABLET ORAL at 11:53

## 2020-05-11 RX ADMIN — OXYCODONE HYDROCHLORIDE AND ACETAMINOPHEN 1500 MG: 500 TABLET ORAL at 05:32

## 2020-05-11 RX ADMIN — PANTOPRAZOLE SODIUM 40 MG: 40 TABLET, DELAYED RELEASE ORAL at 05:37

## 2020-05-11 RX ADMIN — OXYCODONE HYDROCHLORIDE AND ACETAMINOPHEN 1500 MG: 500 TABLET ORAL at 11:52

## 2020-05-11 RX ADMIN — Medication 10 ML: at 05:32

## 2020-05-11 RX ADMIN — OXYCODONE HYDROCHLORIDE AND ACETAMINOPHEN 1500 MG: 500 TABLET ORAL at 17:09

## 2020-05-11 RX ADMIN — Medication 10 ML: at 13:56

## 2020-05-11 RX ADMIN — Medication 220 MG: at 07:41

## 2020-05-11 NOTE — DISCHARGE INSTRUCTIONS
Patient Education     Patient Education        Nueve cosas que debe hacer si se ha expuesto al COVID-19  9 Things To Do If You've Been Exposed to COVID-19    Jelena Sheffield en casa. Si esteban estado expuesto, debe permanecer aislado rocky 14 días. No vaya a la escuela, al [place] ni a lugares públicos. Y no use el [facility]e [facility] solo si necesita [place]. [place] llame haseeb al [employer] del médico para que sepan que usted va a venir, y use taqueria cubierta facial de carly Guadlupe Maria Guadalupe. Llame a cristobal Gifford Fletcher a cristobal médico u otro profesional de la do para informarles de que ha [employer]. Es posible que le indiquen que se oswaldo taqueria prueba de detección o que tengan otras instrucciones para usted. Si se enferma, use taqueria cubierta facial cuando esté cerca de [employer]. [place] puede ayudar a detener la propagación del virus cuando usted tose o estornuda. Limite el contacto con las personas en cristobal hogar. Si es posible, quédese en taqueria habitación separada y use un baño separado. Evite el contacto con mascotas y [place]. Cúbrase la boca y la nariz con un pañuelo de papel cuando tosa o estornude. Luego tire el pañuelo de papel a la basura de inmediato. Lávese las rodney con frecuencia, especialmente después de toser o estornudar. Use agua y [place], y fróteselas rocky al menos 20 segundos. Si no tiene agua y jabón disponibles, use un desinfectante para rodney a base de alcohol. No comparta artículos personales del hogar. Estos incluyen ropa de cama, toallas, tazas y vasos, y utensilios para comer. Limpie y desinfecte cristobal hogar todos los días. Use limpiadores domésticos o toallitas o aerosoles desinfectantes. Tenga especial cuidado de limpiar las cosas que agarra con las [place]. Estas incluyen perillas de telly, controles remotos, teléfonos y manijas del refrigerador y microondas. Y no olvide las encimeras, [place], jarrod y teclados de computadora.    Revisado: 15 abril, 2020               Versión del contenido: 12.4  © 7743-8697 Healthwise, Incorporated. Las instrucciones de cuidado fueron adaptadas bajo [place] por cristobal profesional de [facility]. Si usted tiene [place] afección médica o sobre estas instrucciones, siempre pregunte a cristobal profesional de do. Healthwise, Incorporated niega toda garantía o responsabilidad por cristobal uso de esta información. Patient Education        Alo Cris de correr un alto riesgo de enfermarse de COVID-19  Learning About Being High-Risk for COVID-19  ¿Quién corre un alto riesgo? COVID-19 causa enfermedad leve en muchas personas que contraen el virus. [place] ciertas cosas pueden aumentar el riesgo de enfermarse de mayor gravedad. Estas incluyen:  · Tener [address] [facility] o más. · Fumar. · Vivir en un centro de atención a luis carlos plazo. · Parthenia Highman graves de do persistentes. Laisha Akash son:  ? Enfermedad pulmonar crónica o asma. ? Problemas cardíacos. ? Un sistema inmunitario debilitado. ? Estar bajo tratamiento para el cáncer. ? Diabetes. Esta no es [employer]. Si tiene un problema crónico de do, pregúntele a cristobal médico si debe aleida precauciones adicionales rocky el brote epidémico.  Si está embarazada, lo más seguro es considerar que corre un mayor riesgo. Todavía no se sabe si COVID-19 es peligroso para usted o cristobal bebé. [place] el embarazo aumenta el riesgo de enfermarse gravemente por virus similares a COVID-19.  ¿Cómo se mantiene usted a shruti? · Quédese en casa. ? Quédese en casa lo más que pueda. Esta podría ser la manera más fácil de evitar la exposición, siempre y cuando nadie más en cristobal hogar tenga el virus. ? Si hay muchos casos de COVID-19 en cristobal comunidad, no salga de casa excepto para obtener [address] [facility]. ? Limite las visitas en roel momento. Es especialmente importante evitar el contacto con cualquier persona que esté enferma o que haya [place].  Recuerde que las personas pueden haberse expuesto sin saberlo o sin tener ningún síntoma. ? Tenga a mano suficientes alimentos, medicamentos y [de-identified] suministros para que no tenga que salir. Pruebe algunas de estas opciones si no tiene lo que necesita. ? Use servicios de entrega a domicilio y comida para llevar para las compras y comidas. ? Deje que un familiar, amigo o vecino que esté mikhail compre por usted. ? Pídale a cristobal médico medicamentos recetados adicionales. ? Limpie y desinfecte de forma rutinaria las superficies de alto contacto. Estas incluyen encimeras, grifos, manijas y perillas de telly así rajesh teléfonos. ? No viaje. · Lávese las rodney traci y con frecuencia. ? Lávese las rodney con frecuencia, especialmente después de toser o estornudar. Use agua y [place], y fróteselas rocky al menos 20 segundos. Si no tiene agua y jabón disponibles, use un desinfectante para rodney a base de alcohol. · Tenga especial cuidado si tiene que salir. ? Evite las multitudes y los lugares concurridos. Trate de mantener 6 pies de distancia entre usted y los demás. ? No use transportes públicos, transportes compartidos ni taxis a menos que no tenga otra opción. ? Trate de no tocar cosas que muchas otras personas hayan tocado. [address] telly, los botones del elevador, las agarraderas de los carritos de la compra y los pasamanos en las escaleras mecánicas se tocan mucho.  ? Clifm Mailman pañuelos o toallas de papel. Si tiene que tocar algo, podrá [facility]. ? No le dé la mano a nadie. En cristobal lugar, salude con un simple movimiento de la mano. ? No se toque la artemio y Lionel rodney con frecuencia. ? Lávese las rodney de nuevo vasquez pronto rajesh llegue a casa. · Sepa cuándo llamar a cristobal médico.  ? Llame a un médico si tiene síntomas de COVID-19 (fiebre, tos, falta de [place]). Si le indican que se oswaldo taqueria prueba de detección o que [place] atención y tiene que salir, use taqueria cubierta facial de carly.   Revisado: 15 prashanth, 2020               Versión del contenido: 12.4  © 5327-6861 Healthwise, Incorporated. Las instrucciones de cuidado fueron adaptadas bajo [place] por cristobal profesional de Nabor goddard. Si usted tiene [place] afección médica o sobre estas instrucciones, siempre pregunte a cristobal profesional de do. Healthwise, Incorporated niega toda garantía o responsabilidad por cristobal uso de esta información. Aprenda a cuidar a alguien que se ha enfermado de COVID-19  Learning How To Care for Someone Who Has COVID-19  Lo que debe saber  La mayoría de las personas que se enferman de COVID-19 se recuperarán con el tiempo y los cuidados en el hogar. Estas son algunas cosas que debe saber si está cuidando a alguien que está enfermo. · Trate los síntomas. Los síntomas comunes incluyen [place], tos y sensación de falta de aire. Insista en que la persona descanse más y tome mucho líquido para reemplazar los líquidos perdidos debido a la fiebre. Para reducir la fiebre, nirali acetaminofén (rajesh Tylenol). También podría [employer]. Amber y siga todas las instrucciones de la [place]. · Esté atento a las señales de que la enfermedad está empeorando. Es posible que la persona necesite atención médica si está cada vez más enferma (por ejemplo, si le tamara respirar). [place] llame al consultorio médico antes de ir. Pueden decirle qué hacer. Llame al 911 o a los servicios de emergencia si la persona tiene alguno de estos síntomas:  ? Mucha dificultad para respirar o falta de aliento. ? Dolor o presión danilo en el pecho. ? Confusión o problemas para pensar con claridad. ? Un tinte KORINA SRINIVASAN. Lauro labios o en la artemio. Algunas personas tienen más probabilidades de enfermarse de gravedad y necesitar atención médica.  Llame al médico tan pronto rajesh comiencen los síntomas si la persona que cuida tiene más de 65 años, si fuma, o si tiene un problema grave de do, rajesh asma, enfermedad cardíaca, diabetes o un problema del sistema inmunitario. · Protéjase usted y proteja a los demás. El virus se transmite fácilmente de [facility] persona a otra, así que tenga mucho cuidado para evitar contagiarse o transmitir la infección. ? Mantenga a la persona enferma lo más lejos que pueda de los demás. § [place] que la persona se quede en taqueria habitación. Si puede, deje que tenga cristobal propio baño. § Chadd que solo taqueria persona se encargue de cuidar al enfermo. [place] a otras personas, y a las mascotas, fuera de la habitación del enfermo. § Chadd que la persona use taqueria cubierta facial de carly (si la tiene) cuando esté cerca de otras personas. [place] incluye cuando alguien esté con yady en la habitación o si sale de la habitación (por ejemplo, para ir al baño). Si la cubierta facial le dificulta la respiración al enfermo, entonces el cuidador debe usar taqueria cubierta facial.  § No comparta artículos personales. Estos incluyen platos, vasos, toallas y ropa de [place]. ? [facility] y hágSaint Alphonsus Eagle con frecuencia. Use agua y [place], y fróteselas rocky al menos 20 segundos. [place] es especialmente importante después de kell estado cerca del enfermo o de kell tocado cosas que esta persona ha tocado. Si no tiene agua y jabón disponibles, use un desinfectante para rodney con al menos un 60% de alcohol. ? Evite tocarse los ojos, la nariz y la boca. ? Tenga cuidado con la ropa sucia de la persona. Puede rufino la ropa de la persona enferma junto con la suya. Si los tiene, use guantes desechables cuando [employer] ropa sucia del enfermo, y [address] las rodney después de tocarla. Lave los artículos en el agua más caliente permitida para el tipo de carly y séquelos por completo. ? Limpie los objetos que se tocan con frecuencia a diario y cada vez que la persona enferma los toque. Estos incluyen perillas de telly, interruptores de baldev, inodoros, encimeras y controles remotos. Use un desinfectante doméstico o taqueria solución de [place]. ([address]). Si la persona enferma tiene cristobal propia habitación, oswaldo que la desinfecte todos los días. ? Limite las visitas a cristobal hogar. Para ayudar a proteger a familiares y amigos, manténgase en contacto con ellos solo por teléfono o computadora. Revisado: 15 abril, 2020               Versión del contenido: 12.4  © 2006-2020 Healthwise, Incorporated. Las instrucciones de cuidado fueron adaptadas bajo [place] por cristobal profesional de [place]. Si usted tiene [place] afección médica o sobre estas instrucciones, siempre pregunte a cristobal profesional de do. Healthwise, Incorporated niega toda garantía o responsabilidad por cristoabl uso de esta información. Patient Education        Neumonía: Instrucciones de cuidado  Pneumonia: Care Instructions  Instrucciones de cuidado    La neumonía es taqueria infección de los pulmones. [place] de los casos son causados por infecciones debidas a bacterias o virus. La neumonía puede ser leve o muy grave. Si es causada por bacterias, será tratado con antibióticos. La recuperación completa de la neumonía podría aleida [employer] o algunos meses, dependiendo de qué tan enfermo estuvo y si cristobal estado general de do es quevedo. La atención de seguimiento es taqueria parte clave de cristobal tratamiento y seguridad. Asegúrese de hacer y acudir a todas las citas, y llame a cristobal médico si está teniendo problemas. También es taqueria buena idea saber los resultados de mario exámenes y mantener taqueria lista de los medicamentos que nayla. ¿Cómo puede cuidarse en el hogar? · [address] [facility],[address] [place] indicaciones. No deje de tomarlos por el hecho de sentirse mejor. Debe aleida todos los antibióticos hasta terminarlos. · [employer] medicamentos exactamente rajesh le fueron recetados. Llame a cristobal médico si crystal estar teniendo problemas con cristobal medicamento. · Descanse y duerma lo suficiente.  Podría sentirse cansado y [address] un [address], nancy critsobal nivel de energía mejorará con el tiempo. · Para prevenir la deshidratación, modesta abundantes líquidos, los suficientes para que cristobal orina sea de color amarillo brennon o edwardo rajesh el agua. Elija agua y otros líquidos damaris sin cafeína hasta que se sienta mejor. Si tiene taqueria enfermedad del riñón, del corazón o del hígado y tiene que Levar's líquidos, hable con cristobal médico antes de aumentar cristobal consumo. · Trate la tos para que pueda descansar. La tos con mucosidad (flema) de los pulmones es común con la neumonía. Es taqueria de las [employer] que cristobal organismo [address] [place] si la tos le impide descansar o le causa gran fatiga y dolor en la pared torácica, hable con cristobal médico. Podría sugerirle que tome un medicamento para aliviar la tos. · Utilice un vaporizador o humidificador para añadir humedad en cristobal habitación. Siga las indicaciones para limpiar el aparato. · No fume ni permita que otras personas fumen cerca de usted. Fumar hará que la tos dure [place]. Si necesita ayuda para dejar de fumar, hable con cristobal médico [employer] y medicamentos para dejar de fumar. Éstos pueden aumentar mraio probabilidades de dejar el hábito para siempre. · Padroni un analgésico (medicamento para el dolor) de venta michaelle, rajesh acetaminofén (Tylenol), ibuprofeno (Advil, Motrin) o naproxeno (Aleve). Amber y siga todas las instrucciones de la [place]. · No tome dos o más analgésicos al mismo tiempo a menos que el médico se lo haya indicado. Muchos analgésicos contienen acetaminofén, es decir, Tylenol. El exceso de acetaminofén (Tylenol) puede ser dañino. · Si le dieron un espirómetro para medir qué tan traci están funcionando mario pulmones, utilícelo rajesh se lo indicaron. [place] puede ayudar a cristobal médico a saber cómo va cristobal recuperación.   · Para prevenir la neumonía en el futuro, hable con cristobal médico acerca de vacunarse contra la gripe (taqueria vez al [place]) y Princella Krystal antineumocócica (sólo taqueria vez para la [address] personas). ¿Cuándo debe pedir ayuda? Llame al 911 en cualquier momento que considere que necesita atención de emergencia. Por ejemplo, llame si:    · Tiene serias dificultades para respirar.    Llame a cristobal médico ahora mismo o busque atención médica inmediata si:    · Tose mucosidad (esputo) de color café oscuro o con cynthia.     · Tiene nueva o peor dificultad para respirar.     · Siente mareos o aturdimiento, o que está a punto de desmayarse.    Preste especial atención a los cambios en cristobal do y asegúrese de comunicarse con cristobal médico si:    · Presenta fiebre o la fiebre es más tanisha.     · Cristobal tos es más profunda o más frecuente que antes.     · No está mejorando después de 2 días (48 horas).     · No mejora rajesh se esperaba. ¿Dónde puede encontrar más información en inglés? Vaya a http://reji-licha.info/  Gray Chapman [id number] en la búsqueda para aprender más acerca de \"Neumonía: Instrucciones de cuidado. \"  Revisado: 9 junio, 2019Versión del contenido: 12.4  © 1770-6424 Healthwise, Incorporated. Las instrucciones de cuidado fueron adaptadas bajo licencia por [employer] (which disclaims liability or warranty for this information). Si usted tiene [place] afección médica o sobre estas instrucciones, siempre pregunte a cristobal profesional de do. Healthwise, Incorporated niega toda garantía o responsabilidad por cristobal uso de esta información.

## 2020-05-11 NOTE — PROGRESS NOTES
Hourly rounds performed. All needs met. Bed is in low position and call light is within reach. . Pt spiked a temp at the beginning of shift. Temp managed by giving pt PRN Tylenol. Pt has no complaints at this time. Will continue to monitor and report to oncoming nurse.

## 2020-05-11 NOTE — PROGRESS NOTES
Received phone call from Sarah Joyner, patients sister-in-law. This family member does speak [place]. Update given. Patient expected to discharge home today. Contact requests phone call with discharge instructions. Will continue to monitor.     Sarah Joyner: [phone]

## 2020-05-11 NOTE — DISCHARGE SUMMARY
Discharge Summary     Patient: Petrona Corona MRN: [id number]  SSN: [id number]    YOB: 1985  Age: 29 y.o. Sex: male       Admit Date: 5/6/2020    Discharge Date: 5/11/2020      Admission Diagnoses: 2019 novel coronavirusinfected pneumonia (NCIP) [U07.1, J12.89]  Hypoxia [R09.02]    Discharge Diagnoses:   Problem List as of 5/11/2020 Date Reviewed: 8/28/2019          Codes Class Noted - Resolved    Hypoxia ICD-10-CM: R09.02  ICD-9-CM: 799.02  5/7/2020 - Present        COVID-19 virus detected ICD-10-CM: U07.1  ICD-9-CM: 519.8, 079.89  5/7/2020 - Present        * (Principal) 2019 novel coronavirusinfected pneumonia (NCIP) ICD-10-CM: U07.1, J12.89  ICD-9-CM: 480.8  5/6/2020 - Present        Viral sepsis ([facility] ICD-10-CM: A41.89, B97.89  ICD-9-CM: 079.99, 995.91  5/6/2020 - Present        BMI 33.0-33.9,adult ICD-10-CM: U58.26  ICD-9-CM: V85.33  5/6/2020 - Present        Low testosterone in male ICD-10-CM: R79.89  ICD-9-CM: 790.99  8/28/2019 - Present        Hypercholesterolemia ICD-10-CM: E78.00  ICD-9-CM: 272.0  Unknown - Present        GERD (gastroesophageal reflux disease) ICD-10-CM: K21.9  ICD-9-CM: 530.81  Unknown - Present        Chronic low back pain without sciatica ICD-10-CM: M54.5, G89.29  ICD-9-CM: 724.2, 338.29  Unknown - Present               Discharge Condition: [facility] Course:   Mr. Nasra Medina is a 29year old male presenting with progressive sob. The Patient tested positive for novel coronavirus infection on 5/2/2020. Patient has been taking codeine/homatroprine, albuterol MDI, levofloxacin, azithromycin as an outpatient with no improvement. He had sepsis criteria and a Chest x-ray with patchy bibasilar opacities, left greater than right with small left pleural effusion. He was started on plaquenil and 1 unit of convalescent plasma. The patient required supplemental oxygen and telemetry checks. There were no prolongations of his QTc.  His clinical status did well and he was able to tolerate oxygen on room air. The patient will be discharged today. I am a native English speak and discharge information provided with RN Reanna Moore. Physical Exam:   GENERAL: alert, cooperative, no distress, appears stated age  EYE: negative  LYMPHATIC: Cervical, supraclavicular, and axillary nodes normal.   THROAT & NECK: normal and no erythema or exudates noted. LUNG: mild rhonchi, no wheezing- on room air  HEART: regular rate and rhythm, S1, S2 normal, no murmur, click, rub or gallop  ABDOMEN: soft, non-tender. Bowel sounds normal. No masses,  no organomegaly  EXTREMITIES:  extremities normal, atraumatic, no cyanosis or edema  SKIN: Normal.  NEUROLOGIC: negative  PSYCHIATRIC: non focal     Consults: None    Significant Diagnostic Studies: see note     Disposition: home    Discharge Medications:   Current Discharge Medication List      START taking these medications    Details   acetaminophen (TYLENOL) 325 mg tablet Take 2 Tabs by mouth every six (6) hours as needed for Pain or Fever. Qty: 30 Tab, Refills: 0      guaiFENesin (ROBITUSSIN) 100 mg/5 mL liquid Take 20 mL by mouth every four (4) hours as needed for Cough. Qty: 1 Bottle, Refills: 1      ondansetron (ZOFRAN ODT) 4 mg disintegrating tablet Take 1 Tab by mouth every eight (8) hours as needed for Nausea or Vomiting. Qty: 20 Tab, Refills: 0         CONTINUE these medications which have NOT CHANGED    Details   hydrocortisone (ANUSOL-HC) 2.5 % rectal cream Insert  into rectum four (4) times daily. Qty: 30 g, Refills: 3    Associated Diagnoses: Grade I hemorrhoids      polyethylene glycol (MIRALAX) 17 gram/dose powder Take 17 g by mouth daily. Qty: 510 g, Refills: 3      raNITIdine (ZANTAC) 300 mg tab Take 1 Tab by mouth nightly.   Qty: 90 Tab, Refills: 3    Associated Diagnoses: Urticaria      testosterone cypionate (DEPOTESTOTERONE CYPIONATE) 200 mg/mL injection 1 cc every Sunday  Qty: 4 Vial, Refills: 5    Associated Diagnoses: Low testosterone in male      Syringe with Needle, Disp, 3 mL 23 gauge x 1 1/2\" syrg USE TO INJECT TESTOSTERONE EVERY WEEK  Qty: 50 Pen Needle, Refills: 5      sulfacetamide (BLEPH-10) 10 % ophthalmic solution 2 drops apply to affected eye every 4 hours  Qty: 1 Bottle, Refills: 0    Associated Diagnoses: Acute viral conjunctivitis of right eye      fexofenadine (ALLEGRA ALLERGY) 180 mg tablet Take 1 Tab by mouth daily. Qty: 30 Tab, Refills: 6    Associated Diagnoses: Urticaria; Environmental and seasonal allergies      omeprazole (PRILOSEC) 40 mg capsule Take 1 Cap by mouth daily for 30 days. Qty: 30 Cap, Refills: 6         STOP taking these medications       HYDROcodone-homatropine (HYCODAN) 5-1.5 mg/5 mL (5 mL) syrup Comments:   Reason for Stopping:         benzonatate (Tessalon Perles) 100 mg capsule Comments:   Reason for Stopping:         naproxen (NAPROSYN) 500 mg tablet Comments:   Reason for Stopping:         levoFLOXacin (Levaquin) 500 mg tablet Comments:   Reason for Stopping:         Needle, Disp, 18 G 18 gauge x 1 1/2\" ndle Comments:   Reason for Stopping:         cyclobenzaprine (FLEXERIL) 10 mg tablet Comments:   Reason for Stopping:         methylPREDNISolone (MEDROL, LEIGHTON,) 4 mg tablet Comments:   Reason for Stopping:               Activity: Activity as tolerated  Diet: Regular Diet  Wound Care: None needed    Follow-up Appointments   Procedures    FOLLOW UP VISIT Appointment in: One Week pcp     pcp     Standing Status:   Standing     Number of Occurrences:   1     Order Specific Question:   Appointment in     Answer:    One Week       Signed By: Jaquan Parrish MD     May 11, 2020

## 2020-05-11 NOTE — PROGRESS NOTES
Pt use O2 periodically O2 sat is 92% on RA. Pt states that his nose is dry and would like a nasal spray because he says when he blow his nose it starts to bleed. Also pt states that he want something to relieve his sore throat. Doctor made aware and orders were given to give pt Phenol throat spray and sodium chloride nasal spray. Will continue to monitor pt.

## 2020-05-11 NOTE — PROGRESS NOTES
Pt is for discharge home today with no needs/supportive care orders received for CM at this time.   Pt will have close follow up with PCP    Milestones met    Care Management Interventions  PCP Verified by CM: Yes(Clark Huntley)  Mode of Transport at Discharge: Self  Transition of Care Consult (CM Consult): Discharge Planning  Discharge Durable Medical Equipment: No  Physical Therapy Consult: No  Occupational Therapy Consult: No  Speech Therapy Consult: No  Current Support Network: Own Home, Family Lives Nearby  Confirm Follow Up Transport: Family  The Patient and/or Patient Representative was Provided with a Choice of Provider and Agrees with the Discharge Plan?: Yes  Freedom of Choice List was Provided with Basic Dialogue that Supports the Patient's Individualized Plan of Care/Goals, Treatment Preferences and Shares the Quality Data Associated with the Providers?: Yes  [employer] Information Provided?: No  Discharge Location  Discharge Placement: Home

## 2020-05-11 NOTE — PROGRESS NOTES
CONSUELO from [facility]. Patient in stable condition with resps even/unlabored. NAD noted. Call light within reach, patient encouraged to call nurse prn assist. Will continue to monitor per policy. Droplet plus precautions intact. Appropriate PPE available and in use.

## 2020-05-11 NOTE — PROGRESS NOTES
Dr. Wen Galdamez at bedside to translate as appropriate per MD request.     Discharge instructions explained to patient. patient verbalized understanding of all instructions both written and verbal. Discharge medications reviewed and Rx's given as appropriate. Patient has contacted his family and is ready for discharge. PIV removed and bandage applied.

## 2020-05-12 ENCOUNTER — PATIENT OUTREACH (OUTPATIENT)
Dept: CASE MANAGEMENT | Age: 35
End: 2020-05-12

## 2020-05-12 NOTE — PROGRESS NOTES
Patient contacted regarding COVID-19 diagnosis. COVID-19 ARTHUR call completed via a . Care Transition Nurse/ Ambulatory Care Manager contacted the patient by telephone to perform post discharge assessment. Verified name and  with patient as identifiers. Provided introduction to self, and explanation of the CTN/ACM role, and reason for call due to risk factors for infection and/or exposure to COVID-19. Symptoms reviewed with patient who verbalized the following symptoms: no worsening symptoms. Due to no new or worsening symptoms encounter was not routed to provider for escalation. Patient has following risk factors of: pneumonia. CTN/ACM reviewed discharge instructions, medical action plan and red flags such as increased shortness of breath, increasing fever and signs of decompensation with patient who verbalized understanding. Discussed exposure protocols and quarantine with CDC Guidelines What to do if you are sick with coronavirus disease .  Patient was given an opportunity for questions and concerns. The patient agrees to contact the [facility] exposure line [phone], [address]: ([phone]) and PCP office for questions related to their healthcare. CTN/ACM provided contact information for future needs. Reviewed and educated patient on any new and changed medications related to discharge diagnosis. Patient/family/caregiver given information for [employer] and agrees to enroll yes  Patient's preferred e-mail:  [email or web address]  Patient's preferred phone number: [phone]  Based on Loop alert triggers, patient will be contacted by nurse care manager for worsening symptoms. Pt will be further monitored by COVID Loop Team based on severity of symptoms and risk factors.

## 2022-03-18 PROBLEM — R09.02 HYPOXIA: Status: ACTIVE | Noted: 2020-05-07

## 2022-03-19 PROBLEM — U07.1 2019 NOVEL CORONAVIRUS-INFECTED PNEUMONIA (NCIP): Status: ACTIVE | Noted: 2020-05-06

## 2022-03-19 PROBLEM — B97.89 VIRAL SEPSIS (HCC): Status: ACTIVE | Noted: 2020-05-06

## 2022-03-19 PROBLEM — U07.1 COVID-19 VIRUS DETECTED: Status: ACTIVE | Noted: 2020-05-07

## 2022-03-19 PROBLEM — A41.89 VIRAL SEPSIS (HCC): Status: ACTIVE | Noted: 2020-05-06

## 2022-03-19 PROBLEM — J12.82 2019 NOVEL CORONAVIRUS-INFECTED PNEUMONIA (NCIP): Status: ACTIVE | Noted: 2020-05-06

## 2022-03-20 PROBLEM — R79.89 LOW TESTOSTERONE IN MALE: Status: ACTIVE | Noted: 2019-08-28

## 2022-06-08 DIAGNOSIS — R79.89 LOW TESTOSTERONE LEVEL IN MALE: ICD-10-CM

## 2022-06-08 DIAGNOSIS — Z00.00 LABORATORY EXAMINATION ORDERED AS PART OF A COMPLETE PHYSICAL EXAMINATION: ICD-10-CM

## 2022-06-08 DIAGNOSIS — Z12.5 PROSTATE CANCER SCREENING: ICD-10-CM

## 2022-06-08 DIAGNOSIS — R79.89 LOW TESTOSTERONE IN MALE: Primary | ICD-10-CM

## 2022-09-20 ENCOUNTER — NURSE ONLY (OUTPATIENT)
Dept: FAMILY MEDICINE CLINIC | Facility: CLINIC | Age: 37
End: 2022-09-20

## 2022-09-20 DIAGNOSIS — Z12.5 PROSTATE CANCER SCREENING: ICD-10-CM

## 2022-09-20 DIAGNOSIS — Z00.00 LABORATORY EXAMINATION ORDERED AS PART OF A COMPLETE PHYSICAL EXAMINATION: ICD-10-CM

## 2022-09-20 DIAGNOSIS — Z79.899 ENCOUNTER FOR LONG-TERM (CURRENT) USE OF MEDICATIONS: Primary | ICD-10-CM

## 2022-09-20 DIAGNOSIS — R79.89 LOW TESTOSTERONE LEVEL IN MALE: ICD-10-CM

## 2022-09-20 LAB
BASOPHILS # BLD: 0 K/UL (ref 0–0.2)
BASOPHILS NFR BLD: 0 % (ref 0–2)
DIFFERENTIAL METHOD BLD: NORMAL
EOSINOPHIL # BLD: 0.3 K/UL (ref 0–0.8)
EOSINOPHIL NFR BLD: 4 % (ref 0.5–7.8)
ERYTHROCYTE [DISTWIDTH] IN BLOOD BY AUTOMATED COUNT: 13.7 % (ref 11.9–14.6)
HCT VFR BLD AUTO: 50.3 % (ref 41.1–50.3)
HGB BLD-MCNC: 16.2 G/DL (ref 13.6–17.2)
IMM GRANULOCYTES # BLD AUTO: 0 K/UL (ref 0–0.5)
IMM GRANULOCYTES NFR BLD AUTO: 0 % (ref 0–5)
LYMPHOCYTES # BLD: 1.5 K/UL (ref 0.5–4.6)
LYMPHOCYTES NFR BLD: 24 % (ref 13–44)
MCH RBC QN AUTO: 29 PG (ref 26.1–32.9)
MCHC RBC AUTO-ENTMCNC: 32.2 G/DL (ref 31.4–35)
MCV RBC AUTO: 90 FL (ref 79.6–97.8)
MONOCYTES # BLD: 0.6 K/UL (ref 0.1–1.3)
MONOCYTES NFR BLD: 10 % (ref 4–12)
NEUTS SEG # BLD: 3.7 K/UL (ref 1.7–8.2)
NEUTS SEG NFR BLD: 62 % (ref 43–78)
NRBC # BLD: 0 K/UL (ref 0–0.2)
PLATELET # BLD AUTO: 185 K/UL (ref 150–450)
PMV BLD AUTO: 11.9 FL (ref 9.4–12.3)
RBC # BLD AUTO: 5.59 M/UL (ref 4.23–5.6)
WBC # BLD AUTO: 6.1 K/UL (ref 4.3–11.1)

## 2022-09-21 LAB
ALBUMIN SERPL-MCNC: 4.2 G/DL (ref 3.5–5)
ALBUMIN/GLOB SERPL: 1.4 {RATIO} (ref 1.2–3.5)
ALP SERPL-CCNC: 62 U/L (ref 50–136)
ALT SERPL-CCNC: 70 U/L (ref 12–65)
ANION GAP SERPL CALC-SCNC: 4 MMOL/L (ref 4–13)
AST SERPL-CCNC: 29 U/L (ref 15–37)
BILIRUB SERPL-MCNC: 0.7 MG/DL (ref 0.2–1.1)
BUN SERPL-MCNC: 11 MG/DL (ref 6–23)
CALCIUM SERPL-MCNC: 9.3 MG/DL (ref 8.3–10.4)
CHLORIDE SERPL-SCNC: 105 MMOL/L (ref 101–110)
CHOLEST SERPL-MCNC: 178 MG/DL
CO2 SERPL-SCNC: 27 MMOL/L (ref 21–32)
CREAT SERPL-MCNC: 1 MG/DL (ref 0.8–1.5)
GLOBULIN SER CALC-MCNC: 3.1 G/DL (ref 2.3–3.5)
GLUCOSE SERPL-MCNC: 103 MG/DL (ref 65–100)
HDLC SERPL-MCNC: 37 MG/DL (ref 40–60)
HDLC SERPL: 4.8 {RATIO}
LDLC SERPL CALC-MCNC: 117.4 MG/DL
POTASSIUM SERPL-SCNC: 4.1 MMOL/L (ref 3.5–5.1)
PROT SERPL-MCNC: 7.3 G/DL (ref 6.3–8.2)
PSA SERPL-MCNC: 0.6 NG/ML
SODIUM SERPL-SCNC: 136 MMOL/L (ref 136–145)
TRIGL SERPL-MCNC: 118 MG/DL (ref 35–150)
VLDLC SERPL CALC-MCNC: 23.6 MG/DL (ref 6–23)

## 2022-09-22 LAB — TESTOST SERPL-MCNC: 421 NG/DL (ref 264–916)

## 2022-09-23 LAB — TESTOST FREE SERPL-MCNC: 7.6 PG/ML (ref 8.7–25.1)

## 2022-09-27 ENCOUNTER — TELEMEDICINE (OUTPATIENT)
Dept: FAMILY MEDICINE CLINIC | Facility: CLINIC | Age: 37
End: 2022-09-27
Payer: COMMERCIAL

## 2022-09-27 DIAGNOSIS — E78.00 HYPERCHOLESTEROLEMIA: ICD-10-CM

## 2022-09-27 DIAGNOSIS — R79.89 LOW TESTOSTERONE LEVEL IN MALE: Primary | ICD-10-CM

## 2022-09-27 PROCEDURE — 99442 PR PHYS/QHP TELEPHONE EVALUATION 11-20 MIN: CPT | Performed by: FAMILY MEDICINE

## 2022-09-27 ASSESSMENT — PATIENT HEALTH QUESTIONNAIRE - PHQ9
1. LITTLE INTEREST OR PLEASURE IN DOING THINGS: 0
2. FEELING DOWN, DEPRESSED OR HOPELESS: 0
SUM OF ALL RESPONSES TO PHQ QUESTIONS 1-9: 0
SUM OF ALL RESPONSES TO PHQ9 QUESTIONS 1 & 2: 0
SUM OF ALL RESPONSES TO PHQ QUESTIONS 1-9: 0

## 2022-09-27 ASSESSMENT — ENCOUNTER SYMPTOMS
NAUSEA: 0
SHORTNESS OF BREATH: 0
VOMITING: 0

## 2022-09-27 NOTE — PROGRESS NOTES
PROGRESS NOTE  This visit was conducted via the phone with patient's consent to the visit and all associated charges to reduce the patient's risk of exposure to COVID-19 and continuity of care for an established patient. He and/or his healthcare decision maker is aware that this patient-initiated phone encounter is a billable service, with coverage as determined by his insurance carrier. He is aware that he may receive a bill and has provided verbal consent to proceed: Yes    Vitals and physical exam deferred due to telephone visit. Total Time: minutes: 11-20 minutes. SUBJECTIVE:   Alicia Burgos is a 40 y.o. male seen for a follow up visit regarding the following chief complaint:           HPI patient is doing a phone call visit to go over his lab results      Past Medical History, Past Surgical History, Family history, Social History, and Medications were all reviewed with the patient today and updated as necessary. Current Outpatient Medications   Medication Sig Dispense Refill    hydrocortisone 2.5 % cream Place rectally 4 times daily      omeprazole (PRILOSEC) 40 MG delayed release capsule Take 40 mg by mouth daily (Patient not taking: Reported on 9/27/2022)      Testosterone 4 MG/24HR PT24 Place 1 patch onto the skin daily. (Patient not taking: Reported on 9/27/2022)      testosterone cypionate (DEPOTESTOTERONE CYPIONATE) 200 MG/ML injection 1 cc every Sunday (Patient not taking: Reported on 9/27/2022)       No current facility-administered medications for this visit.      No Known Allergies  Patient Active Problem List   Diagnosis    Hypoxia    BMI 33.0-33.9,adult    Hypercholesterolemia    GERD (gastroesophageal reflux disease)    Chronic low back pain without sciatica    COVID-19 virus detected    2019 novel coronavirus-infected pneumonia (NCIP)    Viral sepsis ([facility] [address]    Low testosterone in male     Past Medical History:   Diagnosis Date    Chronic low back pain without sciatica GERD (gastroesophageal reflux disease)     Hypercholesterolemia      No past surgical history on file. Family History   Problem Relation Age of Onset    No Known Problems Father     No Known Problems Mother      Social History     Tobacco Use    Smoking status: Never    Smokeless tobacco: Never   Substance Use Topics    Alcohol use: No         Review of Systems   Constitutional:  Negative for fatigue and fever. Respiratory:  Negative for shortness of breath. Cardiovascular:  Negative for chest pain. Gastrointestinal:  Negative for nausea and vomiting. OBJECTIVE:  There were no vitals taken for this visit. Physical Exam     Medical problems and test results were reviewed with the patient today.      Recent Results (from the past 672 hour(s))   CBC with Auto Differential    Collection Time: 09/20/22 10:59 AM   Result Value Ref Range    WBC 6.1 4.3 - 11.1 K/uL    RBC 5.59 4.23 - 5.6 M/uL    Hemoglobin 16.2 13.6 - 17.2 g/dL    Hematocrit 50.3 41.1 - 50.3 %    MCV 90.0 79.6 - 97.8 FL    MCH 29.0 26.1 - 32.9 PG    MCHC 32.2 31.4 - 35.0 g/dL    RDW 13.7 11.9 - 14.6 %    Platelets 441 083 - 047 K/uL    MPV 11.9 9.4 - 12.3 FL    nRBC 0.00 0.0 - 0.2 K/uL    Differential Type AUTOMATED      Seg Neutrophils 62 43 - 78 %    Lymphocytes 24 13 - 44 %    Monocytes 10 4.0 - 12.0 %    Eosinophils % 4 0.5 - 7.8 %    Basophils 0 0.0 - 2.0 %    Immature Granulocytes 0 0.0 - 5.0 %    Segs Absolute 3.7 1.7 - 8.2 K/UL    Absolute Lymph # 1.5 0.5 - 4.6 K/UL    Absolute Mono # 0.6 0.1 - 1.3 K/UL    Absolute Eos # 0.3 0.0 - 0.8 K/UL    Basophils Absolute 0.0 0.0 - 0.2 K/UL    Absolute Immature Granulocyte 0.0 0.0 - 0.5 K/UL   Testosterone Total Only, Male    Collection Time: 09/20/22 10:59 AM   Result Value Ref Range    Testosterone 421 264 - 916 ng/dL   Lipid Panel    Collection Time: 09/20/22 10:59 AM   Result Value Ref Range    Cholesterol, Total 178 <200 MG/DL    Triglycerides 118 35 - 150 MG/DL    HDL 37 (L) 40 - 60 MG/DL    LDL Calculated 117.4 (H) <100 MG/DL    VLDL Cholesterol Calculated 23.6 (H) 6.0 - 23.0 MG/DL    Chol/HDL Ratio 4.8     Comprehensive Metabolic Panel    Collection Time: 09/20/22 10:59 AM   Result Value Ref Range    Sodium 136 136 - 145 mmol/L    Potassium 4.1 3.5 - 5.1 mmol/L    Chloride 105 101 - 110 mmol/L    CO2 27 21 - 32 mmol/L    Anion Gap 4 4 - 13 mmol/L    Glucose 103 (H) 65 - 100 mg/dL    BUN 11 6 - 23 MG/DL    Creatinine 1.00 0.8 - 1.5 MG/DL    GFR African American >60 >60 ml/min/1.73m2    GFR Non- >60 >60 ml/min/1.73m2    Calcium 9.3 8.3 - 10.4 MG/DL    Total Bilirubin 0.7 0.2 - 1.1 MG/DL    ALT 70 (H) 12 - 65 U/L    AST 29 15 - 37 U/L    Alk Phosphatase 62 50 - 136 U/L    Total Protein 7.3 6.3 - 8.2 g/dL    Albumin 4.2 3.5 - 5.0 g/dL    Globulin 3.1 2.3 - 3.5 g/dL    Albumin/Globulin Ratio 1.4 1.2 - 3.5     Testosterone, Free    Collection Time: 09/20/22 10:59 AM   Result Value Ref Range    Testosterone, Free 7.6 (L) 8.7 - 25.1 pg/mL   PSA, Diagnostic    Collection Time: 09/20/22 10:59 AM   Result Value Ref Range    PSA 0.6 <4.0 ng/mL       ASSESSMENT and PLAN    Visit Diagnoses and Associated Orders       Low testosterone level in male    -  Primary         Hypercholesterolemia                         Diagnosis Orders   1. Low testosterone level in male        2.  Hypercholesterolemia        , Diagnoses and all orders for this visit:    Low testosterone level in male    Hypercholesterolemia    , Patient will continue with diet and exercise states that while he was on vacation he did not follow his cholesterol diet therefore his cholesterols elevated patient states he wants to wait till April to recheck his cholesterol family history of hyperlipidemia rest of his labs were normal continue on his present medication diet and exercise

## 2023-04-26 ENCOUNTER — NURSE ONLY (OUTPATIENT)
Dept: FAMILY MEDICINE CLINIC | Facility: CLINIC | Age: 38
End: 2023-04-26
Payer: COMMERCIAL

## 2023-04-26 DIAGNOSIS — Z00.00 LABORATORY EXAMINATION ORDERED AS PART OF A ROUTINE GENERAL MEDICAL EXAMINATION: Primary | ICD-10-CM

## 2023-04-26 DIAGNOSIS — E55.9 VITAMIN D DEFICIENCY, UNSPECIFIED: ICD-10-CM

## 2023-04-26 DIAGNOSIS — Z11.59 NEED FOR HEPATITIS C SCREENING TEST: ICD-10-CM

## 2023-04-26 DIAGNOSIS — E78.00 HYPERCHOLESTEROLEMIA: ICD-10-CM

## 2023-04-26 DIAGNOSIS — R79.89 LOW TESTOSTERONE LEVEL IN MALE: ICD-10-CM

## 2023-04-26 LAB
BILIRUBIN, URINE, POC: NEGATIVE
BLOOD URINE, POC: NEGATIVE
GLUCOSE URINE, POC: NEGATIVE
GRANS ABSOLUTE, POC: 3.4 K/UL
GRANULOCYTES %, POC: 55 %
HEMATOCRIT, POC: 50.8 %
HEMOGLOBIN, POC: 16 G/DL
HIV 1+2 AB+HIV1 P24 AG SERPL QL IA: NONREACTIVE
HIV 1/2 RESULT COMMENT: NORMAL
KETONES, URINE, POC: NEGATIVE
LEUKOCYTE ESTERASE, URINE, POC: NEGATIVE
LYMPHOCYTE %, POC: 35.7 %
LYMPHS ABSOLUTE, POC: 2.2 K/UL
MCH, POC: 29.3 PG (ref 20–?)
MCHC, POC: 31.5
MCV, POC: 92.9
MONOCYTE %, POC: 9.3 %
MONOCYTE, ABSOLUTE POC: 0.6 K/UL
MPV, POC: 8.1 FL
NITRITE, URINE, POC: NEGATIVE
PH, URINE, POC: 5.5 (ref 4.6–8)
PLATELET COUNT, POC: 258 K/UL
PROTEIN,URINE, POC: NEGATIVE
RBC, POC: 5.47 M/UL
RDW, POC: 12.9 %
SPECIFIC GRAVITY, URINE, POC: 1.03 (ref 1–1.03)
URINALYSIS CLARITY, POC: CLEAR
URINALYSIS COLOR, POC: YELLOW
UROBILINOGEN, POC: NORMAL
WBC, POC: 6.2 K/UL

## 2023-04-26 PROCEDURE — 36415 COLL VENOUS BLD VENIPUNCTURE: CPT | Performed by: FAMILY MEDICINE

## 2023-04-26 PROCEDURE — 85025 COMPLETE CBC W/AUTO DIFF WBC: CPT | Performed by: FAMILY MEDICINE

## 2023-04-26 PROCEDURE — 81002 URINALYSIS NONAUTO W/O SCOPE: CPT | Performed by: FAMILY MEDICINE

## 2023-04-27 LAB
25(OH)D3 SERPL-MCNC: 25.8 NG/ML (ref 30–100)
ALBUMIN SERPL-MCNC: 4.1 G/DL (ref 3.5–5)
ALBUMIN/GLOB SERPL: 1.1 (ref 0.4–1.6)
ALP SERPL-CCNC: 77 U/L (ref 50–136)
ALT SERPL-CCNC: 58 U/L (ref 12–65)
ANION GAP SERPL CALC-SCNC: 5 MMOL/L (ref 2–11)
AST SERPL-CCNC: 22 U/L (ref 15–37)
BILIRUB SERPL-MCNC: 0.4 MG/DL (ref 0.2–1.1)
BUN SERPL-MCNC: 9 MG/DL (ref 6–23)
CALCIUM SERPL-MCNC: 9.2 MG/DL (ref 8.3–10.4)
CHLORIDE SERPL-SCNC: 106 MMOL/L (ref 101–110)
CHOLEST SERPL-MCNC: 153 MG/DL
CO2 SERPL-SCNC: 29 MMOL/L (ref 21–32)
CREAT SERPL-MCNC: 1 MG/DL (ref 0.8–1.5)
GLOBULIN SER CALC-MCNC: 3.6 G/DL (ref 2.8–4.5)
GLUCOSE SERPL-MCNC: 105 MG/DL (ref 65–100)
HCV AB SER QL: NONREACTIVE
HDLC SERPL-MCNC: 51 MG/DL (ref 40–60)
HDLC SERPL: 3
LDLC SERPL CALC-MCNC: 89.2 MG/DL
POTASSIUM SERPL-SCNC: 4.3 MMOL/L (ref 3.5–5.1)
PROT SERPL-MCNC: 7.7 G/DL (ref 6.3–8.2)
SODIUM SERPL-SCNC: 140 MMOL/L (ref 133–143)
TRIGL SERPL-MCNC: 64 MG/DL (ref 35–150)
TSH, 3RD GENERATION: 3.49 UIU/ML (ref 0.36–3.74)
VLDLC SERPL CALC-MCNC: 12.8 MG/DL (ref 6–23)

## 2023-04-28 LAB — TESTOST SERPL-MCNC: 445 NG/DL (ref 264–916)

## 2023-05-04 ENCOUNTER — OFFICE VISIT (OUTPATIENT)
Dept: FAMILY MEDICINE CLINIC | Facility: CLINIC | Age: 38
End: 2023-05-04

## 2023-05-04 VITALS
HEIGHT: 71 IN | BODY MASS INDEX: 35.28 KG/M2 | SYSTOLIC BLOOD PRESSURE: 136 MMHG | WEIGHT: 252 LBS | DIASTOLIC BLOOD PRESSURE: 80 MMHG

## 2023-05-04 DIAGNOSIS — E78.00 HYPERCHOLESTEROLEMIA: ICD-10-CM

## 2023-05-04 DIAGNOSIS — R79.89 LOW TESTOSTERONE LEVEL IN MALE: ICD-10-CM

## 2023-05-04 DIAGNOSIS — Z00.00 ROUTINE GENERAL MEDICAL EXAMINATION AT A HEALTH CARE FACILITY: Primary | ICD-10-CM

## 2023-05-04 DIAGNOSIS — Z12.5 PROSTATE CANCER SCREENING: ICD-10-CM

## 2023-05-04 DIAGNOSIS — E55.9 VITAMIN D DEFICIENCY, UNSPECIFIED: ICD-10-CM

## 2023-05-04 DIAGNOSIS — J30.89 ENVIRONMENTAL AND SEASONAL ALLERGIES: ICD-10-CM

## 2023-05-04 DIAGNOSIS — Z13.31 SCREENING FOR DEPRESSION: ICD-10-CM

## 2023-05-04 DIAGNOSIS — K64.8 INTERNAL HEMORRHOIDS: ICD-10-CM

## 2023-05-04 RX ORDER — TRIAMCINOLONE ACETONIDE 40 MG/ML
40 INJECTION, SUSPENSION INTRA-ARTICULAR; INTRAMUSCULAR ONCE
Status: COMPLETED | OUTPATIENT
Start: 2023-05-04 | End: 2023-05-04

## 2023-05-04 RX ORDER — FLUTICASONE PROPIONATE 50 MCG
2 SPRAY, SUSPENSION (ML) NASAL DAILY
Qty: 1 EACH | Refills: 11 | Status: SHIPPED | OUTPATIENT
Start: 2023-05-04

## 2023-05-04 RX ORDER — FEXOFENADINE HCL 180 MG/1
180 TABLET ORAL DAILY
Qty: 30 TABLET | Refills: 11 | Status: SHIPPED | OUTPATIENT
Start: 2023-05-04 | End: 2023-06-03

## 2023-05-04 RX ADMIN — TRIAMCINOLONE ACETONIDE 40 MG: 40 INJECTION, SUSPENSION INTRA-ARTICULAR; INTRAMUSCULAR at 09:18

## 2023-05-04 SDOH — ECONOMIC STABILITY: HOUSING INSECURITY
IN THE LAST 12 MONTHS, WAS THERE A TIME WHEN YOU DID NOT HAVE A STEADY PLACE TO SLEEP OR SLEPT IN A SHELTER (INCLUDING NOW)?: NO

## 2023-05-04 SDOH — ECONOMIC STABILITY: FOOD INSECURITY: WITHIN THE PAST 12 MONTHS, THE FOOD YOU BOUGHT JUST DIDN'T LAST AND YOU DIDN'T HAVE MONEY TO GET MORE.: NEVER TRUE

## 2023-05-04 SDOH — ECONOMIC STABILITY: FOOD INSECURITY: WITHIN THE PAST 12 MONTHS, YOU WORRIED THAT YOUR FOOD WOULD RUN OUT BEFORE YOU GOT MONEY TO BUY MORE.: NEVER TRUE

## 2023-05-04 SDOH — ECONOMIC STABILITY: INCOME INSECURITY: HOW HARD IS IT FOR YOU TO PAY FOR THE VERY BASICS LIKE FOOD, HOUSING, MEDICAL CARE, AND HEATING?: NOT HARD AT ALL

## 2023-05-04 ASSESSMENT — ENCOUNTER SYMPTOMS
CONSTIPATION: 0
VOICE CHANGE: 0
VOMITING: 0
NAUSEA: 0
SINUS PRESSURE: 1
SORE THROAT: 0
COUGH: 0
ABDOMINAL PAIN: 0
DIARRHEA: 0
SINUS PAIN: 1
SHORTNESS OF BREATH: 0
WHEEZING: 0
RHINORRHEA: 1

## 2023-05-04 ASSESSMENT — PATIENT HEALTH QUESTIONNAIRE - PHQ9
SUM OF ALL RESPONSES TO PHQ QUESTIONS 1-9: 0
1. LITTLE INTEREST OR PLEASURE IN DOING THINGS: 0
SUM OF ALL RESPONSES TO PHQ QUESTIONS 1-9: 0
SUM OF ALL RESPONSES TO PHQ9 QUESTIONS 1 & 2: 0
SUM OF ALL RESPONSES TO PHQ QUESTIONS 1-9: 0
2. FEELING DOWN, DEPRESSED OR HOPELESS: 0
SUM OF ALL RESPONSES TO PHQ QUESTIONS 1-9: 0

## 2023-05-04 NOTE — PROGRESS NOTES
PROGRESS NOTE    SUBJECTIVE:   Soni Mcfarlane is a 40 y.o. male seen for a follow up visit regarding the following chief complaint:     Chief Complaint   Patient presents with    Annual Exam    Discuss Labs           HPI patient presents to the office today for complete physical complaining of rectal bleeding past medical history significant for internal hemorrhoids also complaining of allergies      Past Medical History, Past Surgical History, Family history, Social History, and Medications were all reviewed with the patient today and updated as necessary. Current Outpatient Medications   Medication Sig Dispense Refill    hydrocortisone 2.5 % cream Apply topically 2 times daily 40 g 5    testosterone (ANDRODERM) 4 MG/24HR PT24 patch Apply 1 patch topically every 24 hours for 30 days. Max Daily Amount: 4 mg 30 patch 5    fexofenadine (ALLEGRA) 180 MG tablet Take 1 tablet by mouth daily 30 tablet 11    fluticasone (FLONASE) 50 MCG/ACT nasal spray 2 sprays by Each Nostril route daily 1 each 11     No current facility-administered medications for this visit. No Known Allergies  Patient Active Problem List   Diagnosis    Hypoxia    BMI 33.0-33.9,adult    Hypercholesterolemia    GERD (gastroesophageal reflux disease)    Chronic low back pain without sciatica    COVID-19 virus detected    2019 novel coronavirus-infected pneumonia (NCIP)    Viral sepsis ([facility] [address]    Low testosterone in male     Past Medical History:   Diagnosis Date    Chronic low back pain without sciatica     GERD (gastroesophageal reflux disease)     Hypercholesterolemia      No past surgical history on file.   Family History   Problem Relation Age of Onset    No Known Problems Father     No Known Problems Mother      Social History     Tobacco Use    Smoking status: Never     Passive exposure: Never    Smokeless tobacco: Never   Substance Use Topics    Alcohol use: No         Review of Systems   Constitutional:  Negative for chills and

## 2023-05-05 LAB
TESTOST FREE SERPL-MCNC: 5.9 PG/ML (ref 8.7–25.1)
TESTOST SERPL-MCNC: 445 NG/DL (ref 264–916)

## 2023-05-25 DIAGNOSIS — K64.8 INTERNAL HEMORRHOIDS: ICD-10-CM

## 2023-05-26 DIAGNOSIS — K64.8 INTERNAL HEMORRHOIDS: ICD-10-CM

## 2023-06-27 DIAGNOSIS — K64.8 INTERNAL HEMORRHOIDS: ICD-10-CM

## 2023-06-28 DIAGNOSIS — K64.8 INTERNAL HEMORRHOIDS: ICD-10-CM

## 2024-07-09 ENCOUNTER — NURSE ONLY (OUTPATIENT)
Dept: FAMILY MEDICINE CLINIC | Facility: CLINIC | Age: 39
End: 2024-07-09
Payer: COMMERCIAL

## 2024-07-09 DIAGNOSIS — R79.89 LOW TESTOSTERONE IN MALE: ICD-10-CM

## 2024-07-09 DIAGNOSIS — E55.9 VITAMIN D DEFICIENCY, UNSPECIFIED: ICD-10-CM

## 2024-07-09 DIAGNOSIS — R79.89 LOW TESTOSTERONE IN MALE: Primary | ICD-10-CM

## 2024-07-09 DIAGNOSIS — Z12.5 SPECIAL SCREENING FOR MALIGNANT NEOPLASM OF PROSTATE: ICD-10-CM

## 2024-07-09 DIAGNOSIS — Z00.00 LABORATORY EXAM ORDERED AS PART OF ROUTINE GENERAL MEDICAL EXAMINATION: ICD-10-CM

## 2024-07-09 DIAGNOSIS — Z00.00 LABORATORY EXAMINATION ORDERED AS PART OF A ROUTINE GENERAL MEDICAL EXAMINATION: Primary | ICD-10-CM

## 2024-07-09 DIAGNOSIS — E78.00 HYPERCHOLESTEROLEMIA: ICD-10-CM

## 2024-07-09 LAB
25(OH)D3 SERPL-MCNC: 21.1 NG/ML (ref 30–100)
ALBUMIN SERPL-MCNC: 4.1 G/DL (ref 3.5–5)
ALBUMIN/GLOB SERPL: 1.5 (ref 1–1.9)
ALP SERPL-CCNC: 65 U/L (ref 40–129)
ALT SERPL-CCNC: 56 U/L (ref 12–65)
ANION GAP SERPL CALC-SCNC: 11 MMOL/L (ref 9–18)
AST SERPL-CCNC: 30 U/L (ref 15–37)
BILIRUB SERPL-MCNC: 0.4 MG/DL (ref 0–1.2)
BILIRUBIN, URINE, POC: NEGATIVE
BLOOD URINE, POC: NEGATIVE
BUN SERPL-MCNC: 12 MG/DL (ref 6–23)
CALCIUM SERPL-MCNC: 9.2 MG/DL (ref 8.8–10.2)
CHLORIDE SERPL-SCNC: 104 MMOL/L (ref 98–107)
CHOLEST SERPL-MCNC: 163 MG/DL (ref 0–200)
CO2 SERPL-SCNC: 26 MMOL/L (ref 20–28)
CREAT SERPL-MCNC: 0.95 MG/DL (ref 0.8–1.3)
GLOBULIN SER CALC-MCNC: 2.7 G/DL (ref 2.3–3.5)
GLUCOSE SERPL-MCNC: 101 MG/DL (ref 70–99)
GLUCOSE URINE, POC: NEGATIVE
GRANS ABSOLUTE, POC: 5.2 K/UL
GRANULOCYTES %, POC: 53.9 %
HDLC SERPL-MCNC: 43 MG/DL (ref 40–60)
HDLC SERPL: 3.8 (ref 0–5)
HEMATOCRIT, POC: 50.2 %
HEMOGLOBIN, POC: 16.6 G/DL
KETONES, URINE, POC: NEGATIVE
LDLC SERPL CALC-MCNC: 100 MG/DL (ref 0–100)
LEUKOCYTE ESTERASE, URINE, POC: NEGATIVE
LYMPHOCYTE %, POC: 30.3 %
LYMPHS ABSOLUTE, POC: 2.5 K/UL
MCH, POC: 30.7 PG (ref 20–?)
MCHC, POC: 33.1
MCV, POC: 92.9
MONOCYTE %, POC: 5.8 %
MONOCYTE, ABSOLUTE POC: 0.5 K/UL
MPV, POC: 9 FL
NITRITE, URINE, POC: NEGATIVE
PH, URINE, POC: 6 (ref 4.6–8)
PLATELET COUNT, POC: 222 K/UL
POTASSIUM SERPL-SCNC: 4.3 MMOL/L (ref 3.5–5.1)
PROT SERPL-MCNC: 6.8 G/DL (ref 6.3–8.2)
PROTEIN,URINE, POC: NORMAL
PSA SERPL-MCNC: 0.6 NG/ML (ref 0–4)
RBC, POC: 5.4 M/UL
RDW, POC: 13 %
SODIUM SERPL-SCNC: 141 MMOL/L (ref 136–145)
SPECIFIC GRAVITY, URINE, POC: 1.03 (ref 1–1.03)
TRIGL SERPL-MCNC: 99 MG/DL (ref 0–150)
TSH, 3RD GENERATION: 2.12 UIU/ML (ref 0.27–4.2)
URINALYSIS CLARITY, POC: CLEAR
URINALYSIS COLOR, POC: YELLOW
UROBILINOGEN, POC: NORMAL
VLDLC SERPL CALC-MCNC: 20 MG/DL (ref 6–23)
WBC, POC: 8.1 K/UL

## 2024-07-09 PROCEDURE — 81003 URINALYSIS AUTO W/O SCOPE: CPT | Performed by: FAMILY MEDICINE

## 2024-07-09 PROCEDURE — 85025 COMPLETE CBC W/AUTO DIFF WBC: CPT | Performed by: FAMILY MEDICINE

## 2024-07-11 LAB
TESTOST FREE SERPL-MCNC: 7.7 PG/ML (ref 8.7–25.1)
TESTOST SERPL-MCNC: 412 NG/DL (ref 264–916)

## 2024-07-24 ENCOUNTER — OFFICE VISIT (OUTPATIENT)
Dept: FAMILY MEDICINE CLINIC | Facility: CLINIC | Age: 39
End: 2024-07-24
Payer: COMMERCIAL

## 2024-07-24 VITALS
DIASTOLIC BLOOD PRESSURE: 62 MMHG | WEIGHT: 247 LBS | SYSTOLIC BLOOD PRESSURE: 138 MMHG | BODY MASS INDEX: 34.58 KG/M2 | HEART RATE: 85 BPM | OXYGEN SATURATION: 98 % | HEIGHT: 71 IN

## 2024-07-24 DIAGNOSIS — Z00.00 ROUTINE GENERAL MEDICAL EXAMINATION AT A HEALTH CARE FACILITY: ICD-10-CM

## 2024-07-24 DIAGNOSIS — R10.9 ABDOMINAL DISCOMFORT: Primary | ICD-10-CM

## 2024-07-24 DIAGNOSIS — K64.8 HEMORRHOID PROLAPSE: ICD-10-CM

## 2024-07-24 DIAGNOSIS — Z13.31 SCREENING FOR DEPRESSION: ICD-10-CM

## 2024-07-24 DIAGNOSIS — K21.9 GASTROESOPHAGEAL REFLUX DISEASE WITHOUT ESOPHAGITIS: ICD-10-CM

## 2024-07-24 PROCEDURE — 99395 PREV VISIT EST AGE 18-39: CPT | Performed by: FAMILY MEDICINE

## 2024-07-24 RX ORDER — FAMOTIDINE 40 MG/1
40 TABLET, FILM COATED ORAL EVERY EVENING
Qty: 30 TABLET | Refills: 3 | Status: SHIPPED | OUTPATIENT
Start: 2024-07-24

## 2024-07-24 RX ORDER — HYDROCORTISONE 25 MG/G
CREAM TOPICAL
Qty: 60 G | Refills: 3 | Status: SHIPPED | OUTPATIENT
Start: 2024-07-24

## 2024-07-24 ASSESSMENT — ENCOUNTER SYMPTOMS
BLOOD IN STOOL: 1
ABDOMINAL DISTENTION: 0
EYES NEGATIVE: 1
RESPIRATORY NEGATIVE: 1
ABDOMINAL PAIN: 0
NAUSEA: 0
ANAL BLEEDING: 1
SHORTNESS OF BREATH: 0
CONSTIPATION: 0
DIARRHEA: 0
VOMITING: 0
RECTAL PAIN: 1

## 2024-07-24 ASSESSMENT — PATIENT HEALTH QUESTIONNAIRE - PHQ9
SUM OF ALL RESPONSES TO PHQ9 QUESTIONS 1 & 2: 2
1. LITTLE INTEREST OR PLEASURE IN DOING THINGS: NOT AT ALL
2. FEELING DOWN, DEPRESSED OR HOPELESS: MORE THAN HALF THE DAYS

## 2024-07-24 ASSESSMENT — VISUAL ACUITY: OU: 1

## 2024-07-24 NOTE — PROGRESS NOTES
PROGRESS NOTE    SUBJECTIVE:   Fareed Estrada is a 38 y.o. male seen for a follow up visit regarding the following chief complaint:     Chief Complaint   Patient presents with    Annual Exam           HPI patient presents the office today for complete physical complaining of hemorrhoids has been using Anusol HC cream but wants to have his hemorrhoids removed surgically since they are bothersome      Past Medical History, Past Surgical History, Family history, Social History, and Medications were all reviewed with the patient today and updated as necessary.       Current Outpatient Medications   Medication Sig Dispense Refill    famotidine (PEPCID) 40 MG tablet Take 1 tablet by mouth every evening 30 tablet 3    hydrocortisone (ANUSOL-HC) 2.5 % CREA rectal cream Insert into rectum 4 times daily as needed for hemorrhoids (please provide applicator tip) 60 g 3    hydrocortisone 2.5 % cream Apply topically 2 times daily 40 g 11    testosterone (ANDRODERM) 4 MG/24HR PT24 patch Apply 1 patch topically every 24 hours for 30 days. Max Daily Amount: 4 mg 30 patch 5     No current facility-administered medications for this visit.     No Known Allergies  Patient Active Problem List   Diagnosis    Hypoxia    BMI 33.0-33.9,adult    Hypercholesterolemia    GERD (gastroesophageal reflux disease)    Chronic low back pain without sciatica    COVID-19 virus detected    2019 novel coronavirus-infected pneumonia (NCIP)    Viral sepsis (HCC)    Low testosterone in male     Past Medical History:   Diagnosis Date    Chronic low back pain without sciatica     GERD (gastroesophageal reflux disease)     Hypercholesterolemia      No past surgical history on file.  Family History   Problem Relation Age of Onset    No Known Problems Father     No Known Problems Mother      Social History     Tobacco Use    Smoking status: Never     Passive exposure: Never    Smokeless tobacco: Never   Substance Use Topics    Alcohol use: No

## 2024-07-26 LAB — UREA BREATH TEST QL: NEGATIVE

## 2024-08-01 ENCOUNTER — OFFICE VISIT (OUTPATIENT)
Dept: SURGERY | Age: 39
End: 2024-08-01
Payer: COMMERCIAL

## 2024-08-01 VITALS
HEART RATE: 105 BPM | WEIGHT: 245 LBS | HEIGHT: 71 IN | BODY MASS INDEX: 34.3 KG/M2 | SYSTOLIC BLOOD PRESSURE: 128 MMHG | OXYGEN SATURATION: 95 % | DIASTOLIC BLOOD PRESSURE: 68 MMHG

## 2024-08-01 DIAGNOSIS — K64.2 PROLAPSED INTERNAL HEMORRHOIDS, GRADE 3: Primary | ICD-10-CM

## 2024-08-01 PROCEDURE — 99205 OFFICE O/P NEW HI 60 MIN: CPT | Performed by: SURGERY

## 2024-08-01 RX ORDER — HYDROCORTISONE ACETATE 25 MG/1
25 SUPPOSITORY RECTAL EVERY 12 HOURS
Qty: 24 SUPPOSITORY | Refills: 0 | Status: SHIPPED | OUTPATIENT
Start: 2024-08-01

## 2024-08-01 NOTE — PROGRESS NOTES
GENERAL SURGERY NEW PATIENT NOTE    PRIMARY CARE PROVIDER:Carroll Joyce DO    REFERRING PHYSICIAN: Carroll Joyce    Dear Dr. Joyce,    I had the pleasure of seeing your patient, Fareed Estrada, in the General Surgery Clinic at [facility]. My findings and recommendations are as follows:    Date of visit: 08/01/24    CHIEF COMPLAINT:   Chief Complaint   Patient presents with    Hemorrhoids       HISTORY OF PRESENT ILLNESS: Fareed Estrada is a 38 y.o. male with history of HLD, GERD, chronic low back pain, and grade 1 internal hemorrhoids status post banding 2019 by Dr. Johnson referred to me for evaluation of recurrent hemorrhoids.    Patient states that prior to his banding of his grade 1 internal hemorrhoids in 2019 he was experiencing some bleeding with bowel movements and states that it was an fair amount of blood.  He also endorsed some itching.  However, after his banding he states that the hemorrhoids went away and he did not have any other problems until approximately 6 months ago.  He states that 6 months ago he began to notice blood in the toilet again and now the hemorrhoid had come back to where it was sticking out of his bottom and he had to push it back in.  Patient states that after he pushes the hemorrhoid back in it while standing for a while, but that it will eventually come back out again.  He also endorses some itching at his anus.  Denies any perianal pain, tenesmus, or dyschezia.  He does state that he will notice a small amount of blood in the toilet after bowel movements.  He states that there will be more blood after he wipes his bottom.  He has tried Anusol cream and increasing his water and fiber intake without much benefit.  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  He denies any straining on the toilet.  States that he only spends 1 to 2 minutes on the

## 2025-01-06 ENCOUNTER — TELEPHONE (OUTPATIENT)
Dept: FAMILY MEDICINE CLINIC | Facility: CLINIC | Age: 40
End: 2025-01-06

## 2025-01-06 NOTE — TELEPHONE ENCOUNTER
Patient states his insurance does no cover testosterone Rx, he is no longer using this medication Dr Joyce is aware  as per his last appointment on 07/2024

## 2025-04-04 ENCOUNTER — OFFICE VISIT (OUTPATIENT)
Dept: FAMILY MEDICINE CLINIC | Facility: CLINIC | Age: 40
End: 2025-04-04
Payer: COMMERCIAL

## 2025-04-04 VITALS
DIASTOLIC BLOOD PRESSURE: 98 MMHG | BODY MASS INDEX: 35.01 KG/M2 | HEART RATE: 68 BPM | SYSTOLIC BLOOD PRESSURE: 132 MMHG | WEIGHT: 251 LBS

## 2025-04-04 DIAGNOSIS — R53.83 OTHER FATIGUE: ICD-10-CM

## 2025-04-04 DIAGNOSIS — I10 ESSENTIAL HYPERTENSION: Primary | ICD-10-CM

## 2025-04-04 DIAGNOSIS — R73.9 ELEVATED BLOOD SUGAR: ICD-10-CM

## 2025-04-04 LAB
ALBUMIN SERPL-MCNC: 4.3 G/DL (ref 3.5–5)
ALBUMIN/GLOB SERPL: 1.3 (ref 1–1.9)
ALP SERPL-CCNC: 64 U/L (ref 40–129)
ALT SERPL-CCNC: 61 U/L (ref 8–55)
ANION GAP SERPL CALC-SCNC: 10 MMOL/L (ref 7–16)
AST SERPL-CCNC: 30 U/L (ref 15–37)
BILIRUB SERPL-MCNC: 0.5 MG/DL (ref 0–1.2)
BUN SERPL-MCNC: 14 MG/DL (ref 6–23)
CALCIUM SERPL-MCNC: 9.7 MG/DL (ref 8.8–10.2)
CHLORIDE SERPL-SCNC: 105 MMOL/L (ref 98–107)
CO2 SERPL-SCNC: 28 MMOL/L (ref 20–29)
CREAT SERPL-MCNC: 0.91 MG/DL (ref 0.8–1.3)
EST. AVERAGE GLUCOSE BLD GHB EST-MCNC: 127 MG/DL
GLOBULIN SER CALC-MCNC: 3.2 G/DL (ref 2.3–3.5)
GLUCOSE SERPL-MCNC: 101 MG/DL (ref 70–99)
HBA1C MFR BLD: 6.1 % (ref 0–5.6)
POTASSIUM SERPL-SCNC: 4.4 MMOL/L (ref 3.5–5.1)
PROT SERPL-MCNC: 7.4 G/DL (ref 6.3–8.2)
SODIUM SERPL-SCNC: 142 MMOL/L (ref 136–145)
TSH, 3RD GENERATION: 1.76 UIU/ML (ref 0.27–4.2)

## 2025-04-04 PROCEDURE — 3075F SYST BP GE 130 - 139MM HG: CPT | Performed by: FAMILY MEDICINE

## 2025-04-04 PROCEDURE — 3080F DIAST BP >= 90 MM HG: CPT | Performed by: FAMILY MEDICINE

## 2025-04-04 PROCEDURE — 99214 OFFICE O/P EST MOD 30 MIN: CPT | Performed by: FAMILY MEDICINE

## 2025-04-04 RX ORDER — OLMESARTAN MEDOXOMIL 20 MG/1
20 TABLET ORAL DAILY
Qty: 90 TABLET | Refills: 3 | Status: SHIPPED | OUTPATIENT
Start: 2025-04-04

## 2025-04-04 SDOH — ECONOMIC STABILITY: FOOD INSECURITY: WITHIN THE PAST 12 MONTHS, YOU WORRIED THAT YOUR FOOD WOULD RUN OUT BEFORE YOU GOT MONEY TO BUY MORE.: NEVER TRUE

## 2025-04-04 SDOH — ECONOMIC STABILITY: FOOD INSECURITY: WITHIN THE PAST 12 MONTHS, THE FOOD YOU BOUGHT JUST DIDN'T LAST AND YOU DIDN'T HAVE MONEY TO GET MORE.: NEVER TRUE

## 2025-04-04 ASSESSMENT — ENCOUNTER SYMPTOMS
RHINORRHEA: 0
COUGH: 0
SINUS PAIN: 0
SHORTNESS OF BREATH: 0
ABDOMINAL PAIN: 0
DIARRHEA: 0

## 2025-04-04 ASSESSMENT — PATIENT HEALTH QUESTIONNAIRE - PHQ9
2. FEELING DOWN, DEPRESSED OR HOPELESS: NOT AT ALL
SUM OF ALL RESPONSES TO PHQ QUESTIONS 1-9: 0
1. LITTLE INTEREST OR PLEASURE IN DOING THINGS: NOT AT ALL
SUM OF ALL RESPONSES TO PHQ QUESTIONS 1-9: 0

## 2025-04-04 NOTE — PROGRESS NOTES
PROGRESS NOTE    SUBJECTIVE:   Fareed Estrada is a 39 y.o. male seen for a follow up visit regarding the following chief complaint:     Chief Complaint   Patient presents with    Follow-up     Labs and BP home bp is 147/88, 151/90 and 157/87  Doesn't remember blood sugar numbers.     PATIENT STATES NOT TAKING PEPCID SO I D/C. NEEDS APPOINTMENT FOR REFILL. PATIENT NOTIFIED.           HPI patient presents office for recheck of his lab results recheck of his blood pressure and states that he checked his blood sugar with his grandfather's glucometer and it was high states it was around 147 and he did not after eating      Past Medical History, Past Surgical History, Family history, Social History, and Medications were all reviewed with the patient today and updated as necessary.       Current Outpatient Medications   Medication Sig Dispense Refill    olmesartan (BENICAR) 20 MG tablet Take 1 tablet by mouth daily 90 tablet 3    hydrocortisone (ANUSOL-HC) 2.5 % CREA rectal cream Insert into rectum 4 times daily as needed for hemorrhoids (please provide applicator tip) 60 g 3     No current facility-administered medications for this visit.     No Known Allergies  Patient Active Problem List   Diagnosis    Hypoxia    BMI 33.0-33.9,adult    Hypercholesterolemia    GERD (gastroesophageal reflux disease)    Chronic low back pain without sciatica    COVID-19 virus detected    2019 novel coronavirus-infected pneumonia (NCIP)    Viral sepsis (HCC)    Low testosterone in male     Past Medical History:   Diagnosis Date    Chronic low back pain without sciatica     GERD (gastroesophageal reflux disease)     Hypercholesterolemia      No past surgical history on file.  Family History   Problem Relation Age of Onset    No Known Problems Father     No Known Problems Mother      Social History     Tobacco Use    Smoking status: Never     Passive exposure: Never    Smokeless tobacco: Never   Substance Use Topics    Alcohol

## 2025-04-17 ENCOUNTER — TELEMEDICINE (OUTPATIENT)
Dept: FAMILY MEDICINE CLINIC | Facility: CLINIC | Age: 40
End: 2025-04-17
Payer: COMMERCIAL

## 2025-04-17 DIAGNOSIS — I10 ESSENTIAL HYPERTENSION: ICD-10-CM

## 2025-04-17 DIAGNOSIS — R73.9 ELEVATED BLOOD SUGAR: Primary | ICD-10-CM

## 2025-04-17 PROCEDURE — 99213 OFFICE O/P EST LOW 20 MIN: CPT | Performed by: FAMILY MEDICINE

## 2025-04-17 ASSESSMENT — ENCOUNTER SYMPTOMS
VOMITING: 0
SHORTNESS OF BREATH: 0
NAUSEA: 0

## 2025-04-17 NOTE — PROGRESS NOTES
PROGRESS NOTE    SUBJECTIVE:   Fareed Estrada is a 39 y.o. male seen for a follow up visit regarding the following chief complaint:     Chief Complaint   Patient presents with    Follow-up           HPI patient is doing a phone call visit to go over his lab results without any new complaintsFareed Estrada is a 39 y.o. male evaluated via telephone on 4/17/2025 for Follow-up  .    Total time was 15 minutes    Total Time: minutes: 11-20 minutes    Fareed Estrada was evaluated through a synchronous (real-time) audio encounter. Patient identification was verified at the start of the visit. He (or guardian if applicable) is aware that this is a billable service, which includes applicable co-pays. This visit was conducted with the patient's (and/or legal guardian's) verbal consent. He has not had a related appointment within my department in the past 7 days or scheduled within the next 24 hours.   The patient was located at Home: [address] [place] [address] [place] [address].  The provider was located at Facility (Appt Dept): [address] [place] [address]  [facility] [address].    Note: not billable if this call serves to triage the patient into an appointment for the relevant concern         Past Medical History, Past Surgical History, Family history, Social History, and Medications were all reviewed with the patient today and updated as necessary.       Current Outpatient Medications   Medication Sig Dispense Refill    olmesartan (BENICAR) 20 MG tablet Take 1 tablet by mouth daily 90 tablet 3    hydrocortisone (ANUSOL-HC) 2.5 % CREA rectal cream Insert into rectum 4 times daily as needed for hemorrhoids (please provide applicator tip) 60 g 3     No current facility-administered medications for this visit.     No Known Allergies  Patient Active Problem List   Diagnosis    Hypoxia    BMI 33.0-33.9,adult    Hypercholesterolemia    GERD (gastroesophageal reflux disease)    Chronic low back pain

## 2025-07-18 DIAGNOSIS — Z00.00 LABORATORY EXAMINATION ORDERED AS PART OF A ROUTINE GENERAL MEDICAL EXAMINATION: Primary | ICD-10-CM

## 2025-07-18 DIAGNOSIS — E55.9 VITAMIN D DEFICIENCY, UNSPECIFIED: ICD-10-CM

## 2025-07-18 DIAGNOSIS — I10 ESSENTIAL HYPERTENSION: ICD-10-CM

## 2025-07-18 DIAGNOSIS — R73.9 ELEVATED BLOOD SUGAR: ICD-10-CM
